# Patient Record
Sex: FEMALE | Race: ASIAN | NOT HISPANIC OR LATINO
[De-identification: names, ages, dates, MRNs, and addresses within clinical notes are randomized per-mention and may not be internally consistent; named-entity substitution may affect disease eponyms.]

---

## 2017-10-12 ENCOUNTER — TRANSCRIPTION ENCOUNTER (OUTPATIENT)
Age: 32
End: 2017-10-12

## 2017-10-16 ENCOUNTER — APPOINTMENT (OUTPATIENT)
Dept: ORTHOPEDIC SURGERY | Facility: CLINIC | Age: 32
End: 2017-10-16

## 2017-11-02 ENCOUNTER — APPOINTMENT (OUTPATIENT)
Dept: ORTHOPEDIC SURGERY | Facility: CLINIC | Age: 32
End: 2017-11-02
Payer: MEDICAID

## 2017-11-02 VITALS
DIASTOLIC BLOOD PRESSURE: 111 MMHG | SYSTOLIC BLOOD PRESSURE: 142 MMHG | HEIGHT: 64 IN | HEART RATE: 92 BPM | BODY MASS INDEX: 27.66 KG/M2 | WEIGHT: 162 LBS

## 2017-11-02 DIAGNOSIS — Z78.9 OTHER SPECIFIED HEALTH STATUS: ICD-10-CM

## 2017-11-02 DIAGNOSIS — Z86.39 PERSONAL HISTORY OF OTHER ENDOCRINE, NUTRITIONAL AND METABOLIC DISEASE: ICD-10-CM

## 2017-11-02 DIAGNOSIS — M54.12 RADICULOPATHY, CERVICAL REGION: ICD-10-CM

## 2017-11-02 PROCEDURE — 99204 OFFICE O/P NEW MOD 45 MIN: CPT

## 2017-11-02 PROCEDURE — 72040 X-RAY EXAM NECK SPINE 2-3 VW: CPT

## 2017-11-02 PROCEDURE — 72100 X-RAY EXAM L-S SPINE 2/3 VWS: CPT

## 2017-11-02 RX ORDER — CYCLOBENZAPRINE HYDROCHLORIDE 5 MG/1
5 TABLET, FILM COATED ORAL
Qty: 30 | Refills: 0 | Status: ACTIVE | COMMUNITY
Start: 2017-09-26

## 2017-11-02 RX ORDER — PREDNISONE 10 MG/1
10 TABLET ORAL
Qty: 42 | Refills: 0 | Status: ACTIVE | COMMUNITY
Start: 2017-09-26

## 2017-11-02 RX ORDER — METFORMIN HYDROCHLORIDE 1000 MG/1
1000 TABLET, COATED ORAL
Refills: 0 | Status: ACTIVE | COMMUNITY

## 2017-11-02 RX ORDER — GABAPENTIN 300 MG/1
300 CAPSULE ORAL
Qty: 90 | Refills: 0 | Status: ACTIVE | COMMUNITY
Start: 2017-09-26

## 2017-11-02 RX ORDER — NAPROXEN 500 MG/1
500 TABLET ORAL
Qty: 60 | Refills: 0 | Status: ACTIVE | COMMUNITY
Start: 2017-09-26

## 2017-11-14 ENCOUNTER — APPOINTMENT (OUTPATIENT)
Dept: MRI IMAGING | Facility: IMAGING CENTER | Age: 32
End: 2017-11-14

## 2018-01-01 ENCOUNTER — OUTPATIENT (OUTPATIENT)
Dept: OUTPATIENT SERVICES | Facility: HOSPITAL | Age: 33
LOS: 1 days | End: 2018-01-01
Payer: MEDICAID

## 2018-01-03 ENCOUNTER — RX RENEWAL (OUTPATIENT)
Age: 33
End: 2018-01-03

## 2018-01-18 ENCOUNTER — EMERGENCY (EMERGENCY)
Facility: HOSPITAL | Age: 33
LOS: 1 days | Discharge: HOME | End: 2018-01-18
Attending: EMERGENCY MEDICINE | Admitting: EMERGENCY MEDICINE
Payer: MEDICAID

## 2018-01-18 VITALS
HEART RATE: 109 BPM | DIASTOLIC BLOOD PRESSURE: 82 MMHG | SYSTOLIC BLOOD PRESSURE: 144 MMHG | TEMPERATURE: 98 F | RESPIRATION RATE: 18 BRPM | OXYGEN SATURATION: 98 %

## 2018-01-18 PROCEDURE — 99284 EMERGENCY DEPT VISIT MOD MDM: CPT | Mod: 25

## 2018-01-18 RX ORDER — ACETAMINOPHEN 500 MG
650 TABLET ORAL ONCE
Qty: 0 | Refills: 0 | Status: COMPLETED | OUTPATIENT
Start: 2018-01-18 | End: 2018-01-19

## 2018-01-18 RX ORDER — LIDOCAINE 4 G/100G
1 CREAM TOPICAL ONCE
Qty: 0 | Refills: 0 | Status: COMPLETED | OUTPATIENT
Start: 2018-01-18 | End: 2018-01-18

## 2018-01-18 RX ORDER — OXYCODONE HYDROCHLORIDE 5 MG/1
5 TABLET ORAL ONCE
Qty: 0 | Refills: 0 | Status: DISCONTINUED | OUTPATIENT
Start: 2018-01-18 | End: 2018-01-18

## 2018-01-18 RX ORDER — KETOROLAC TROMETHAMINE 30 MG/ML
30 SYRINGE (ML) INJECTION ONCE
Qty: 0 | Refills: 0 | Status: DISCONTINUED | OUTPATIENT
Start: 2018-01-18 | End: 2018-01-18
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
## 2018-01-19 ENCOUNTER — INPATIENT (INPATIENT)
Facility: HOSPITAL | Age: 33
LOS: 3 days | Discharge: HOME | End: 2018-01-23
Attending: HOSPITALIST | Admitting: HOSPITALIST
Payer: MEDICAID

## 2018-01-19 VITALS
RESPIRATION RATE: 18 BRPM | DIASTOLIC BLOOD PRESSURE: 66 MMHG | TEMPERATURE: 98 F | HEART RATE: 83 BPM | SYSTOLIC BLOOD PRESSURE: 130 MMHG | OXYGEN SATURATION: 100 %

## 2018-01-19 VITALS
OXYGEN SATURATION: 99 % | SYSTOLIC BLOOD PRESSURE: 131 MMHG | HEART RATE: 89 BPM | DIASTOLIC BLOOD PRESSURE: 72 MMHG | RESPIRATION RATE: 18 BRPM

## 2018-01-19 DIAGNOSIS — E11.9 TYPE 2 DIABETES MELLITUS WITHOUT COMPLICATIONS: ICD-10-CM

## 2018-01-19 DIAGNOSIS — Z29.9 ENCOUNTER FOR PROPHYLACTIC MEASURES, UNSPECIFIED: ICD-10-CM

## 2018-01-19 DIAGNOSIS — R26.2 DIFFICULTY IN WALKING, NOT ELSEWHERE CLASSIFIED: ICD-10-CM

## 2018-01-19 DIAGNOSIS — M54.30 SCIATICA, UNSPECIFIED SIDE: ICD-10-CM

## 2018-01-19 DIAGNOSIS — M54.31 SCIATICA, RIGHT SIDE: ICD-10-CM

## 2018-01-19 LAB
ALBUMIN SERPL ELPH-MCNC: 3.9 G/DL — SIGNIFICANT CHANGE UP (ref 3.3–5)
ALP SERPL-CCNC: 45 U/L — SIGNIFICANT CHANGE UP (ref 40–120)
ALT FLD-CCNC: 20 U/L — SIGNIFICANT CHANGE UP (ref 4–33)
AST SERPL-CCNC: 22 U/L — SIGNIFICANT CHANGE UP (ref 4–32)
BASOPHILS # BLD AUTO: 0.04 K/UL — SIGNIFICANT CHANGE UP (ref 0–0.2)
BASOPHILS NFR BLD AUTO: 0.5 % — SIGNIFICANT CHANGE UP (ref 0–2)
BILIRUB SERPL-MCNC: 0.7 MG/DL — SIGNIFICANT CHANGE UP (ref 0.2–1.2)
BUN SERPL-MCNC: 13 MG/DL — SIGNIFICANT CHANGE UP (ref 7–23)
CALCIUM SERPL-MCNC: 8.6 MG/DL — SIGNIFICANT CHANGE UP (ref 8.4–10.5)
CHLORIDE SERPL-SCNC: 98 MMOL/L — SIGNIFICANT CHANGE UP (ref 98–107)
CO2 SERPL-SCNC: 23 MMOL/L — SIGNIFICANT CHANGE UP (ref 22–31)
CREAT SERPL-MCNC: 0.47 MG/DL — LOW (ref 0.5–1.3)
EOSINOPHIL # BLD AUTO: 0.07 K/UL — SIGNIFICANT CHANGE UP (ref 0–0.5)
EOSINOPHIL NFR BLD AUTO: 0.8 % — SIGNIFICANT CHANGE UP (ref 0–6)
GLUCOSE SERPL-MCNC: 268 MG/DL — HIGH (ref 70–99)
HCT VFR BLD CALC: 43.3 % — SIGNIFICANT CHANGE UP (ref 34.5–45)
HGB BLD-MCNC: 14.8 G/DL — SIGNIFICANT CHANGE UP (ref 11.5–15.5)
IMM GRANULOCYTES # BLD AUTO: 0.02 # — SIGNIFICANT CHANGE UP
IMM GRANULOCYTES NFR BLD AUTO: 0.2 % — SIGNIFICANT CHANGE UP (ref 0–1.5)
LYMPHOCYTES # BLD AUTO: 3.14 K/UL — SIGNIFICANT CHANGE UP (ref 1–3.3)
LYMPHOCYTES # BLD AUTO: 36.1 % — SIGNIFICANT CHANGE UP (ref 13–44)
MCHC RBC-ENTMCNC: 29.7 PG — SIGNIFICANT CHANGE UP (ref 27–34)
MCHC RBC-ENTMCNC: 34.2 % — SIGNIFICANT CHANGE UP (ref 32–36)
MCV RBC AUTO: 86.9 FL — SIGNIFICANT CHANGE UP (ref 80–100)
MONOCYTES # BLD AUTO: 0.67 K/UL — SIGNIFICANT CHANGE UP (ref 0–0.9)
MONOCYTES NFR BLD AUTO: 7.7 % — SIGNIFICANT CHANGE UP (ref 2–14)
NEUTROPHILS # BLD AUTO: 4.77 K/UL — SIGNIFICANT CHANGE UP (ref 1.8–7.4)
NEUTROPHILS NFR BLD AUTO: 54.7 % — SIGNIFICANT CHANGE UP (ref 43–77)
NRBC # FLD: 0 — SIGNIFICANT CHANGE UP
PLATELET # BLD AUTO: 287 K/UL — SIGNIFICANT CHANGE UP (ref 150–400)
PMV BLD: 9.3 FL — SIGNIFICANT CHANGE UP (ref 7–13)
POTASSIUM SERPL-MCNC: 4.3 MMOL/L — SIGNIFICANT CHANGE UP (ref 3.5–5.3)
POTASSIUM SERPL-SCNC: 4.3 MMOL/L — SIGNIFICANT CHANGE UP (ref 3.5–5.3)
PROT SERPL-MCNC: 7.4 G/DL — SIGNIFICANT CHANGE UP (ref 6–8.3)
RBC # BLD: 4.98 M/UL — SIGNIFICANT CHANGE UP (ref 3.8–5.2)
RBC # FLD: 11.4 % — SIGNIFICANT CHANGE UP (ref 10.3–14.5)
SODIUM SERPL-SCNC: 134 MMOL/L — LOW (ref 135–145)
WBC # BLD: 8.71 K/UL — SIGNIFICANT CHANGE UP (ref 3.8–10.5)
WBC # FLD AUTO: 8.71 K/UL — SIGNIFICANT CHANGE UP (ref 3.8–10.5)

## 2018-01-19 PROCEDURE — 96372 THER/PROPH/DIAG INJ SC/IM: CPT

## 2018-01-19 PROCEDURE — 99283 EMERGENCY DEPT VISIT LOW MDM: CPT | Mod: 25

## 2018-01-19 PROCEDURE — 99223 1ST HOSP IP/OBS HIGH 75: CPT

## 2018-01-19 RX ORDER — ONDANSETRON 8 MG/1
4 TABLET, FILM COATED ORAL ONCE
Qty: 0 | Refills: 0 | Status: COMPLETED | OUTPATIENT
Start: 2018-01-19 | End: 2018-01-19

## 2018-01-19 RX ORDER — ACETAMINOPHEN 500 MG
1000 TABLET ORAL ONCE
Qty: 0 | Refills: 0 | Status: COMPLETED | OUTPATIENT
Start: 2018-01-19 | End: 2018-01-19

## 2018-01-19 RX ORDER — DEXTROSE 50 % IN WATER 50 %
1 SYRINGE (ML) INTRAVENOUS ONCE
Qty: 0 | Refills: 0 | Status: DISCONTINUED | OUTPATIENT
Start: 2018-01-19 | End: 2018-01-23

## 2018-01-19 RX ORDER — IBUPROFEN 200 MG
600 TABLET ORAL
Qty: 0 | Refills: 0 | Status: DISCONTINUED | OUTPATIENT
Start: 2018-01-19 | End: 2018-01-20

## 2018-01-19 RX ORDER — DEXTROSE 50 % IN WATER 50 %
25 SYRINGE (ML) INTRAVENOUS ONCE
Qty: 0 | Refills: 0 | Status: DISCONTINUED | OUTPATIENT
Start: 2018-01-19 | End: 2018-01-23

## 2018-01-19 RX ORDER — GLUCAGON INJECTION, SOLUTION 0.5 MG/.1ML
1 INJECTION, SOLUTION SUBCUTANEOUS ONCE
Qty: 0 | Refills: 0 | Status: DISCONTINUED | OUTPATIENT
Start: 2018-01-19 | End: 2018-01-23

## 2018-01-19 RX ORDER — MORPHINE SULFATE 50 MG/1
4 CAPSULE, EXTENDED RELEASE ORAL EVERY 4 HOURS
Qty: 0 | Refills: 0 | Status: DISCONTINUED | OUTPATIENT
Start: 2018-01-19 | End: 2018-01-19

## 2018-01-19 RX ORDER — DIAZEPAM 5 MG
1 TABLET ORAL
Qty: 9 | Refills: 0 | OUTPATIENT
Start: 2018-01-19 | End: 2018-01-21

## 2018-01-19 RX ORDER — MORPHINE SULFATE 50 MG/1
4 CAPSULE, EXTENDED RELEASE ORAL ONCE
Qty: 0 | Refills: 0 | Status: DISCONTINUED | OUTPATIENT
Start: 2018-01-19 | End: 2018-01-19

## 2018-01-19 RX ORDER — CYCLOBENZAPRINE HYDROCHLORIDE 10 MG/1
10 TABLET, FILM COATED ORAL THREE TIMES A DAY
Qty: 0 | Refills: 0 | Status: DISCONTINUED | OUTPATIENT
Start: 2018-01-19 | End: 2018-01-23

## 2018-01-19 RX ORDER — INSULIN LISPRO 100/ML
VIAL (ML) SUBCUTANEOUS AT BEDTIME
Qty: 0 | Refills: 0 | Status: DISCONTINUED | OUTPATIENT
Start: 2018-01-19 | End: 2018-01-22

## 2018-01-19 RX ORDER — ACETAMINOPHEN 500 MG
650 TABLET ORAL ONCE
Qty: 0 | Refills: 0 | Status: COMPLETED | OUTPATIENT
Start: 2018-01-19 | End: 2018-01-19

## 2018-01-19 RX ORDER — MORPHINE SULFATE 50 MG/1
4 CAPSULE, EXTENDED RELEASE ORAL EVERY 6 HOURS
Qty: 0 | Refills: 0 | Status: DISCONTINUED | OUTPATIENT
Start: 2018-01-19 | End: 2018-01-22

## 2018-01-19 RX ORDER — DIAZEPAM 5 MG
5 TABLET ORAL ONCE
Qty: 0 | Refills: 0 | Status: DISCONTINUED | OUTPATIENT
Start: 2018-01-19 | End: 2018-01-19

## 2018-01-19 RX ORDER — SODIUM CHLORIDE 9 MG/ML
1000 INJECTION, SOLUTION INTRAVENOUS
Qty: 0 | Refills: 0 | Status: DISCONTINUED | OUTPATIENT
Start: 2018-01-19 | End: 2018-01-23

## 2018-01-19 RX ORDER — IBUPROFEN 200 MG
600 TABLET ORAL EVERY 6 HOURS
Qty: 0 | Refills: 0 | Status: DISCONTINUED | OUTPATIENT
Start: 2018-01-19 | End: 2018-01-19

## 2018-01-19 RX ORDER — INSULIN LISPRO 100/ML
VIAL (ML) SUBCUTANEOUS
Qty: 0 | Refills: 0 | Status: DISCONTINUED | OUTPATIENT
Start: 2018-01-19 | End: 2018-01-22

## 2018-01-19 RX ORDER — KETOROLAC TROMETHAMINE 30 MG/ML
15 SYRINGE (ML) INJECTION ONCE
Qty: 0 | Refills: 0 | Status: DISCONTINUED | OUTPATIENT
Start: 2018-01-19 | End: 2018-01-19

## 2018-01-19 RX ORDER — DEXTROSE 50 % IN WATER 50 %
12.5 SYRINGE (ML) INTRAVENOUS ONCE
Qty: 0 | Refills: 0 | Status: DISCONTINUED | OUTPATIENT
Start: 2018-01-19 | End: 2018-01-23

## 2018-01-19 RX ADMIN — Medication 650 MILLIGRAM(S): at 01:10

## 2018-01-19 RX ADMIN — ONDANSETRON 4 MILLIGRAM(S): 8 TABLET, FILM COATED ORAL at 00:48

## 2018-01-19 RX ADMIN — Medication 650 MILLIGRAM(S): at 04:34

## 2018-01-19 RX ADMIN — LIDOCAINE 1 PATCH: 4 CREAM TOPICAL at 00:07

## 2018-01-19 RX ADMIN — Medication 2: at 23:37

## 2018-01-19 RX ADMIN — Medication 650 MILLIGRAM(S): at 00:03

## 2018-01-19 RX ADMIN — MORPHINE SULFATE 4 MILLIGRAM(S): 50 CAPSULE, EXTENDED RELEASE ORAL at 16:40

## 2018-01-19 RX ADMIN — Medication 30 MILLIGRAM(S): at 01:10

## 2018-01-19 RX ADMIN — MORPHINE SULFATE 4 MILLIGRAM(S): 50 CAPSULE, EXTENDED RELEASE ORAL at 16:23

## 2018-01-19 RX ADMIN — CYCLOBENZAPRINE HYDROCHLORIDE 10 MILLIGRAM(S): 10 TABLET, FILM COATED ORAL at 20:41

## 2018-01-19 RX ADMIN — Medication 30 MILLIGRAM(S): at 00:03

## 2018-01-19 RX ADMIN — Medication 600 MILLIGRAM(S): at 21:00

## 2018-01-19 RX ADMIN — Medication 15 MILLIGRAM(S): at 15:42

## 2018-01-19 RX ADMIN — Medication 1000 MILLIGRAM(S): at 14:55

## 2018-01-19 RX ADMIN — Medication 5 MILLIGRAM(S): at 01:11

## 2018-01-19 RX ADMIN — OXYCODONE HYDROCHLORIDE 5 MILLIGRAM(S): 5 TABLET ORAL at 00:03

## 2018-01-19 RX ADMIN — Medication 400 MILLIGRAM(S): at 14:41

## 2018-01-19 RX ADMIN — OXYCODONE HYDROCHLORIDE 5 MILLIGRAM(S): 5 TABLET ORAL at 01:10

## 2018-01-19 NOTE — H&P ADULT - NSHPLABSRESULTS_GEN_ALL_CORE
CBC Full  -  ( 19 Jan 2018 17:33 )  WBC Count : 8.71 K/uL  Hemoglobin : 14.8 g/dL  Hematocrit : 43.3 %  Platelet Count - Automated : 287 K/uL  Mean Cell Volume : 86.9 fL  Mean Cell Hemoglobin : 29.7 pg  Mean Cell Hemoglobin Concentration : 34.2 %  Auto Neutrophil # : 4.77 K/uL  Auto Lymphocyte # : 3.14 K/uL  Auto Monocyte # : 0.67 K/uL  Auto Eosinophil # : 0.07 K/uL  Auto Basophil # : 0.04 K/uL  Auto Neutrophil % : 54.7 %  Auto Lymphocyte % : 36.1 %  Auto Monocyte % : 7.7 %  Auto Eosinophil % : 0.8 %  Auto Basophil % : 0.5 %

## 2018-01-19 NOTE — ED ADULT TRIAGE NOTE - CHIEF COMPLAINT QUOTE
Arrives via EMS with c/o left sided back pain radiating down leg with tingling and numbness.  Pt evaluated for same at [facility] under the name of Rosy MR [id number], d/c this morning.  As per note pt has been evaluated by spinal surgeon and pending MRI, currently on PT.

## 2018-01-19 NOTE — ED PROVIDER NOTE - PHYSICAL EXAMINATION
ATTENDING PHYSICAL EXAM DR. MCINTOSH ***GEN - mild distress, writhing pain; A+O x3 ***HEAD - NC/AT ***EYES/NOSE - PERRL, EOMI, mucous membranes moist, no discharge ***THROAT: Oral cavity and pharynx normal. No inflammation, swelling, exudate, or lesions.  ***NECK: Neck supple, non-tender without lymphadenopathy, no masses, no thyromegaly.   ***PULMONARY - CTA b/l, symmetric breath sounds. ***CARDIAC -s1s2, RRR, no M,G,R  ***ABDOMEN - +BS, ND, NT, soft, no guarding, no rebound, no masses   ***BACK - no CVA tenderness, Normal  spine ***EXTREMITIES - symmetric pulses, 2+ dp, capillary refill < 2 seconds, no clubbing, no cyanosis, no edema ***SKIN - no rash or bruising, UE skin lesions scarred ?track marks  ***NEUROLOGIC - alert, CN 2-12 intact, reflexes nl, sensation nl, coordination nl, finger to nose nl, poor effort but motor 5/5 RUE/LUE/RLE/LLE/EHL/Plantar flexion, no pronator drift

## 2018-01-19 NOTE — H&P ADULT - PROBLEM SELECTOR PLAN 2
Hx of DM, will check a1c and provide consistent carbohydrate diet  Will add ISS PRN, check FS qid Acute on Chronic Sciatica refractory to Percocet at home x 1 day.   - Flexaril 10 mg PO TID PRN Muscle Spasm, Ibuprofen 600 mg PO q6h PRN Severe Pain, and Morphine 4 mg IV q6h PRN Breakthrough Pain  - Trend BMP daily  - PT Consult  - Follow up MRI of Lumbar Spine

## 2018-01-19 NOTE — ED PROVIDER NOTE - OBJECTIVE STATEMENT
32 y.o. female hx of DM p/w lower back pain. States that pain started on Sunday, no trauma or injury to the area. Pain was manageable at first but progressed to the point where she is now having pain with ambulation and certain positions. Pain is mostly located in the R buttock area radiating down the entire RLE into the foot with associated numbness and tingling. Denies bowel/bladder incontinence, saddle anesthesia, fevers, chills, dysuria. Took oxycodone for pain today but obtained no relief.

## 2018-01-19 NOTE — H&P ADULT - HISTORY OF PRESENT ILLNESS
32F hx of L4/L5 herniated disc, Diabetes, presents to [facility] ED c/o severe right sided 10/10 RLE pain. Pain starts at her right buttocks and radiates down to her right foot. It is sharp in quality and is 10/10 starting today. The first time she had sciatica pain was October 2017, and since then she has been maintaining herself with PT and PRN Percocet. Today her pain is worse, not alleviated by percocet and nothing provocative in particular. She denies feeling concomitant fevers or chills. She initially went to [facility] ER but was discharged. Because the pain was so severe affecting her walking she came to [facility] ED.    In the ED patient was given 1g IV Tylenol (14:00), Toradol 15 mg IV x 1 (15:00), Valium 2mg IV x 1 (16:00), and Morphine 4 mg IV x 1 (16:00).

## 2018-01-19 NOTE — ED PROVIDER NOTE - PROGRESS NOTE DETAILS
Patient was endorsed to me by Dr. Santiago     at  1    to follow up results of   reassess      and dispo pending these results and re evaluation. Upon my re evaluation of the patient I found that patient resting comfortably - story consistent with sciatica - pt has seen spinal surgeon and is scheduled for MRI and has PT - will d/c with follow up Camron Salomon M.D., Attending Physician

## 2018-01-19 NOTE — H&P ADULT - PROBLEM SELECTOR PLAN 3
Low risk no VTE required  Dispo Home pending ability to ambulate Hx of DM, will check a1c and provide consistent carbohydrate diet  Will add ISS PRN, check FS qid

## 2018-01-19 NOTE — ED PROVIDER NOTE - PLAN OF CARE
1. return for worsening symptoms or anything concerning to you  2. take all home meds as prescribed  3. follow up with your pmd call to make an appointment  4. Take Tylenol 650 mg every 6 hours as needed for pain.  5. Take motrin 600mg PO Q6 hours prn pain

## 2018-01-19 NOTE — H&P ADULT - NSHPPHYSICALEXAM_GEN_ALL_CORE
Vital Signs Last 24 Hrs  T(C): 36.5 (19 Jan 2018 12:00), Max: 36.7 (18 Jan 2018 23:05)  T(F): 97.7 (19 Jan 2018 12:00), Max: 98.1 (19 Jan 2018 01:53)  HR: 83 (19 Jan 2018 12:00) (79 - 109)  BP: 130/66 (19 Jan 2018 12:00) (124/68 - 144/82)  BP(mean): --  RR: 18 (19 Jan 2018 12:00) (16 - 18)  SpO2: 100% (19 Jan 2018 12:00) (98% - 100%)    General: uncomfortable 2/2 pain, speaking in full sentences   HEENT: EOMI, PERRLA, no conjunctival pallor, MMM, no JVD, no thyromegaly, neck supple, trachea midline  CV: S1S2 RRR no MRG  Lungs: CTA BL  Abdomen: soft NTND +BS   Extremities: No CCE +WWP  Skin/MSK: No rashes, preserved ROM on active & passive movement  Neuro: AAOx3, no focal deficits (5/5 strength all extremities), no sensory deficits, (+) Straight leg test on right

## 2018-01-19 NOTE — ED PROVIDER NOTE - MEDICAL DECISION MAKING DETAILS
Pamela: 32 year old female with dm here with right sided buttock pain radiating down rle. + straight leg test, + 5/5 b/l le. took oxycodone for pain with minimal relief. no midline tenderness, no bowel/bladder incontinence. no fevers. + ttp right buttocks.  will give pain control, reassess.

## 2018-01-19 NOTE — ED ADULT NURSE REASSESSMENT NOTE - NS ED NURSE REASSESS COMMENT FT1
Pt asleep in stretcher with eyes closed, arouses easily to voice, pt is AAOx4, NAD, resp nonlabored, resting comfortably in bed. Pt no longer expressive, restless. Pt reports improvement in R buttock pain to 8/10. Pt denies headache, dizziness, chest pain, palpitations, SOB, abd pain, n/v/d, urinary/bowel incontinence/symptoms, fevers, chills, weakness, numbness/tingling at this time. Pt awaiting reassessment and dispo. Comfort measures provided. Safety maintained.

## 2018-01-19 NOTE — ED PROVIDER NOTE - PROGRESS NOTE DETAILS
Patient reports ongoing severe pain and inability to ambulate. Has received tylenol, toradol, morphine. Will need admit for MRI, PT, pain control. Not CDU candidate due to non ambulatory status.   Jonathan Wilcox MD, PGY1

## 2018-01-19 NOTE — H&P ADULT - PROBLEM SELECTOR PLAN 1
Acute on Chronic Sciatica refractory to Percocet at home x 1 day.   - Flexaril 10 mg PO TID PRN Muscle Spasm, Ibuprofen 600 mg PO q6h PRN Severe Pain, and Morphine 4 mg IV q6h PRN Breakthrough Pain  - Trend BMP daily  - PT Consult  - Follow up MRI of Lumbar Spine Being admitted for inabiilty to ambulate 2/2 acute intractable back pain  - Pain control PRN and PT consult

## 2018-01-19 NOTE — ED ADULT NURSE NOTE - CHIEF COMPLAINT QUOTE
Arrives via EMS with c/o left sided back pain radiating down leg with tingling and numbness.  Pt evaluated for same at [facility] under the name of Rosy MR [id number], d/c this morning.  As per note pt has been evaluated by spinal surgeon and pending MRI, currently on PT.

## 2018-01-19 NOTE — H&P ADULT - NSHPREVIEWOFSYSTEMS_GEN_ALL_CORE
REVIEW OF SYSTEMS:    CONSTITUTIONAL: No weakness, fevers or chills  EYES/ENT: No visual changes;  No vertigo or throat pain   NECK: No pain or stiffness  RESPIRATORY: No cough, wheezing, hemoptysis; No shortness of breath  CARDIOVASCULAR: No chest pain or palpitations  GASTROINTESTINAL: No abdominal or epigastric pain. No nausea, vomiting, or hematemesis; No diarrhea or constipation. No melena or hematochezia.  GENITOURINARY: No dysuria, frequency or hematuria  NEUROLOGICAL: No numbness or weakness  MSK: (+) RLE pain   SKIN: No itching, burning, rashes, or lesions   All other review of systems is negative unless indicated above.

## 2018-01-19 NOTE — ED PROVIDER NOTE - CARE PLAN
Principal Discharge DX:	Back pain  Assessment and plan of treatment:	1. return for worsening symptoms or anything concerning to you  2. take all home meds as prescribed  3. follow up with your pmd call to make an appointment  4. Take Tylenol 650 mg every 6 hours as needed for pain.  5. Take motrin 600mg PO Q6 hours prn pain

## 2018-01-19 NOTE — H&P ADULT - PROBLEM SELECTOR PROBLEM 2
Type 2 diabetes mellitus without complication, without long-term current use of insulin Sciatica of right side

## 2018-01-19 NOTE — ED PROVIDER NOTE - MEDICAL DECISION MAKING DETAILS
32F, hx DM, presents with right sided low back pain radiating to right leg, acute on chronic. Seen at [facility] ED this AM and d/c home with dx sciatica. Endorsing ongoing pain, no relief with percocet this AM. Follows with Dr Long. Will administer 1g tylenol, re-assess. Currently stable, Will continue to follow up and re-assess. Case discussed with Attending: Tessa Wilcox MD, PGY1

## 2018-01-19 NOTE — ED ADULT NURSE NOTE - OBJECTIVE STATEMENT
32 F presents to the ED with non traumatic right buttock pain radiating down the leg.  Pt was seen at [facility] earlier this morning and discharged on tylenol and motrin.  Pt denies bowel or urinary incontinence, numbness or tingling to extremities.  + Difficulty walking.  Pt is on physical therapy and follows with Ortho Dr. Long and is awaiting and MRI.
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
## 2018-01-19 NOTE — ED PROVIDER NOTE - OBJECTIVE STATEMENT
32F presents to the ED with lower back pain, chronic  but worse over the past 2 days.  Pt was seen at [facility] earlier this morning and discharged on tylenol and motrin.  No bowel or urinary incontinence.  +paresthesia +anesthesia to RLE.  No weakness to extremities.   Pain radiates to RLE.  No IVDA use, no fever.  Took one percocet PTA at home this morning with no relief.    No recent trauma.  Pt had similar exacerbation in Sept 2017.   Pt is on physical therapy and follows with Ortho Dr. Long and is awaiting and MRI.

## 2018-01-20 LAB
BUN SERPL-MCNC: 15 MG/DL — SIGNIFICANT CHANGE UP (ref 7–23)
CALCIUM SERPL-MCNC: 8.9 MG/DL — SIGNIFICANT CHANGE UP (ref 8.4–10.5)
CHLORIDE SERPL-SCNC: 102 MMOL/L — SIGNIFICANT CHANGE UP (ref 98–107)
CO2 SERPL-SCNC: 23 MMOL/L — SIGNIFICANT CHANGE UP (ref 22–31)
CREAT SERPL-MCNC: 0.44 MG/DL — LOW (ref 0.5–1.3)
GLUCOSE SERPL-MCNC: 260 MG/DL — HIGH (ref 70–99)
HBA1C BLD-MCNC: 11.7 % — HIGH (ref 4–5.6)
MAGNESIUM SERPL-MCNC: 2.1 MG/DL — SIGNIFICANT CHANGE UP (ref 1.6–2.6)
POTASSIUM SERPL-MCNC: 4.6 MMOL/L — SIGNIFICANT CHANGE UP (ref 3.5–5.3)
POTASSIUM SERPL-SCNC: 4.6 MMOL/L — SIGNIFICANT CHANGE UP (ref 3.5–5.3)
SODIUM SERPL-SCNC: 139 MMOL/L — SIGNIFICANT CHANGE UP (ref 135–145)

## 2018-01-20 PROCEDURE — 99233 SBSQ HOSP IP/OBS HIGH 50: CPT

## 2018-01-20 PROCEDURE — 72148 MRI LUMBAR SPINE W/O DYE: CPT | Mod: 26

## 2018-01-20 RX ORDER — GABAPENTIN 400 MG/1
100 CAPSULE ORAL THREE TIMES A DAY
Qty: 0 | Refills: 0 | Status: DISCONTINUED | OUTPATIENT
Start: 2018-01-20 | End: 2018-01-22

## 2018-01-20 RX ORDER — OXYCODONE AND ACETAMINOPHEN 5; 325 MG/1; MG/1
1 TABLET ORAL ONCE
Qty: 0 | Refills: 0 | Status: DISCONTINUED | OUTPATIENT
Start: 2018-01-20 | End: 2018-01-20

## 2018-01-20 RX ORDER — INSULIN GLARGINE 100 [IU]/ML
15 INJECTION, SOLUTION SUBCUTANEOUS AT BEDTIME
Qty: 0 | Refills: 0 | Status: DISCONTINUED | OUTPATIENT
Start: 2018-01-20 | End: 2018-01-21

## 2018-01-20 RX ORDER — KETOROLAC TROMETHAMINE 30 MG/ML
30 SYRINGE (ML) INJECTION EVERY 8 HOURS
Qty: 0 | Refills: 0 | Status: DISCONTINUED | OUTPATIENT
Start: 2018-01-20 | End: 2018-01-22

## 2018-01-20 RX ORDER — HUMAN INSULIN 100 [IU]/ML
6 INJECTION, SUSPENSION SUBCUTANEOUS ONCE
Qty: 0 | Refills: 0 | Status: COMPLETED | OUTPATIENT
Start: 2018-01-20 | End: 2018-01-20

## 2018-01-20 RX ORDER — INSULIN LISPRO 100/ML
3 VIAL (ML) SUBCUTANEOUS
Qty: 0 | Refills: 0 | Status: DISCONTINUED | OUTPATIENT
Start: 2018-01-20 | End: 2018-01-21

## 2018-01-20 RX ORDER — INSULIN GLARGINE 100 [IU]/ML
10 INJECTION, SOLUTION SUBCUTANEOUS AT BEDTIME
Qty: 0 | Refills: 0 | Status: DISCONTINUED | OUTPATIENT
Start: 2018-01-20 | End: 2018-01-20

## 2018-01-20 RX ORDER — INFLUENZA VIRUS VACCINE 15; 15; 15; 15 UG/.5ML; UG/.5ML; UG/.5ML; UG/.5ML
0.5 SUSPENSION INTRAMUSCULAR ONCE
Qty: 0 | Refills: 0 | Status: DISCONTINUED | OUTPATIENT
Start: 2018-01-20 | End: 2018-01-23

## 2018-01-20 RX ADMIN — Medication 3: at 13:14

## 2018-01-20 RX ADMIN — OXYCODONE AND ACETAMINOPHEN 1 TABLET(S): 5; 325 TABLET ORAL at 13:45

## 2018-01-20 RX ADMIN — Medication 30 MILLIGRAM(S): at 20:40

## 2018-01-20 RX ADMIN — Medication 2: at 18:37

## 2018-01-20 RX ADMIN — OXYCODONE AND ACETAMINOPHEN 1 TABLET(S): 5; 325 TABLET ORAL at 13:13

## 2018-01-20 RX ADMIN — CYCLOBENZAPRINE HYDROCHLORIDE 10 MILLIGRAM(S): 10 TABLET, FILM COATED ORAL at 21:30

## 2018-01-20 RX ADMIN — CYCLOBENZAPRINE HYDROCHLORIDE 10 MILLIGRAM(S): 10 TABLET, FILM COATED ORAL at 05:46

## 2018-01-20 RX ADMIN — HUMAN INSULIN 6 UNIT(S): 100 INJECTION, SUSPENSION SUBCUTANEOUS at 16:13

## 2018-01-20 RX ADMIN — MORPHINE SULFATE 4 MILLIGRAM(S): 50 CAPSULE, EXTENDED RELEASE ORAL at 10:00

## 2018-01-20 RX ADMIN — Medication 2: at 10:37

## 2018-01-20 RX ADMIN — Medication 1: at 21:30

## 2018-01-20 RX ADMIN — Medication 600 MILLIGRAM(S): at 05:46

## 2018-01-20 RX ADMIN — Medication 3 UNIT(S): at 18:36

## 2018-01-20 RX ADMIN — MORPHINE SULFATE 4 MILLIGRAM(S): 50 CAPSULE, EXTENDED RELEASE ORAL at 09:45

## 2018-01-20 RX ADMIN — Medication 3 UNIT(S): at 13:14

## 2018-01-20 RX ADMIN — MORPHINE SULFATE 4 MILLIGRAM(S): 50 CAPSULE, EXTENDED RELEASE ORAL at 01:00

## 2018-01-20 RX ADMIN — MORPHINE SULFATE 4 MILLIGRAM(S): 50 CAPSULE, EXTENDED RELEASE ORAL at 01:07

## 2018-01-20 RX ADMIN — GABAPENTIN 100 MILLIGRAM(S): 400 CAPSULE ORAL at 21:30

## 2018-01-20 RX ADMIN — MORPHINE SULFATE 4 MILLIGRAM(S): 50 CAPSULE, EXTENDED RELEASE ORAL at 16:46

## 2018-01-20 RX ADMIN — OXYCODONE AND ACETAMINOPHEN 1 TABLET(S): 5; 325 TABLET ORAL at 06:59

## 2018-01-20 RX ADMIN — INSULIN GLARGINE 15 UNIT(S): 100 INJECTION, SOLUTION SUBCUTANEOUS at 21:32

## 2018-01-20 RX ADMIN — Medication 30 MILLIGRAM(S): at 20:18

## 2018-01-20 RX ADMIN — OXYCODONE AND ACETAMINOPHEN 1 TABLET(S): 5; 325 TABLET ORAL at 07:30

## 2018-01-20 RX ADMIN — MORPHINE SULFATE 4 MILLIGRAM(S): 50 CAPSULE, EXTENDED RELEASE ORAL at 01:20

## 2018-01-20 NOTE — PHYSICAL THERAPY INITIAL EVALUATION ADULT - PATIENT PROFILE REVIEW, REHAB EVAL
Pt. profile reviewed, consulted with SANYA GALLARDO prior to initial PT evaluation and tx, as per RN, Pt. is OK to participate in skilled therapy session./yes

## 2018-01-20 NOTE — PHYSICAL THERAPY INITIAL EVALUATION ADULT - IMPAIRED TRANSFERS: SIT/STAND, REHAB EVAL
narrow base of support/pain/impaired postural control/decreased strength/Pt. in agreement to participate in skilled therapy session despite complaints of pain./impaired balance

## 2018-01-20 NOTE — PHYSICAL THERAPY INITIAL EVALUATION ADULT - ASR EQUIP NEEDS DISCH PT EVAL
rolling walker (5 inch wheels)/pt. would benefit from use of Rolling Walker upon D/C to optimize safety within the home and decrease fall risk.

## 2018-01-20 NOTE — PHYSICAL THERAPY INITIAL EVALUATION ADULT - IMPAIRMENTS CONTRIBUTING IMPAIRED BED MOBILITY, REHAB EVAL
pain/impaired balance/decreased strength/Pt. in agreement to participate in skilled therapy session despite complaints of pain.

## 2018-01-20 NOTE — PROGRESS NOTE ADULT - SUBJECTIVE AND OBJECTIVE BOX
Patient is a 32y old  Female who presents with a chief complaint of Sciatica flare (19 Jan 2018 18:35)      SUBJECTIVE / OVERNIGHT EVENTS: Pt was seen and examined at 1245pm, no overnight events, pt reports rt hip and leg pain not better with current meds, no weakness, pt reports that she is not on any meds for dm, was using insulin in the past but has not been taking any meds since 2015, no other complaints.    MEDICATIONS  (STANDING):  dextrose 5%. 1000 milliLiter(s) (50 mL/Hr) IV Continuous <Continuous>  dextrose 50% Injectable 12.5 Gram(s) IV Push once  dextrose 50% Injectable 25 Gram(s) IV Push once  dextrose 50% Injectable 25 Gram(s) IV Push once  gabapentin 100 milliGRAM(s) Oral three times a day  influenza   Vaccine 0.5 milliLiter(s) IntraMuscular once  insulin glargine Injectable (LANTUS) 15 Unit(s) SubCutaneous at bedtime  insulin lispro (HumaLOG) corrective regimen sliding scale   SubCutaneous three times a day before meals  insulin lispro (HumaLOG) corrective regimen sliding scale   SubCutaneous at bedtime  insulin lispro Injectable (HumaLOG) 3 Unit(s) SubCutaneous three times a day before meals  ketorolac   Injectable 30 milliGRAM(s) IV Push every 8 hours    MEDICATIONS  (PRN):  cyclobenzaprine 10 milliGRAM(s) Oral three times a day PRN Muscle Spasm  dextrose Gel 1 Dose(s) Oral once PRN Blood Glucose LESS THAN 70 milliGRAM(s)/deciliter  glucagon  Injectable 1 milliGRAM(s) IntraMuscular once PRN Glucose LESS THAN 70 milligrams/deciliter  morphine  - Injectable 4 milliGRAM(s) IV Push every 6 hours PRN Severe Pain (7 - 10)      Vital Signs Last 24 Hrs  T(C): 36.6 (20 Jan 2018 16:48), Max: 36.9 (20 Jan 2018 05:44)  T(F): 97.9 (20 Jan 2018 16:48), Max: 98.4 (20 Jan 2018 05:44)  HR: 68 (20 Jan 2018 16:48) (68 - 98)  BP: 107/58 (20 Jan 2018 16:48) (95/54 - 129/72)  BP(mean): --  RR: 19 (20 Jan 2018 16:48) (16 - 19)  SpO2: 98% (20 Jan 2018 16:48) (96% - 100%)  CAPILLARY BLOOD GLUCOSE      POCT Blood Glucose.: 300 mg/dL (20 Jan 2018 12:34)  POCT Blood Glucose.: 211 mg/dL (20 Jan 2018 10:35)  POCT Blood Glucose.: 307 mg/dL (19 Jan 2018 23:03)  POCT Blood Glucose.: 260 mg/dL (19 Jan 2018 19:52)    I&O's Summary      PHYSICAL EXAM:  GENERAL: NAD, well-developed  CHEST/LUNG: Clear to auscultation bilaterally; No wheeze  HEART: Regular rate and rhythm  ABDOMEN: Soft, Nontender, Nondistended  EXTREMITIES:  no LE edema  PSYCH: calm  NEUROLOGY: AAOx3, rt hip ttp, motor LE 5/5  SKIN: No rashes or lesions    LABS:                        14.8   8.71  )-----------( 287      ( 19 Jan 2018 17:33 )             43.3     01-20    139  |  102  |  15  ----------------------------<  260<H>  4.6   |  23  |  0.44<L>    Ca    8.9      20 Jan 2018 06:21  Mg     2.1     01-20    TPro  7.4  /  Alb  3.9  /  TBili  0.7  /  DBili  x   /  AST  22  /  ALT  20  /  AlkPhos  45  01-19              RADIOLOGY & ADDITIONAL TESTS:    Imaging Personally Reviewed:    Consultant(s) Notes Reviewed:      Care Discussed with Consultants/Other Providers:

## 2018-01-20 NOTE — PHYSICAL THERAPY INITIAL EVALUATION ADULT - BALANCE DISTURBANCE, IDENTIFIED IMPAIRMENT CONTRIBUTE, REHAB EVAL
decreased strength/Pt. in agreement to participate in skilled therapy session despite complaints of pain./pain/impaired postural control

## 2018-01-20 NOTE — PHYSICAL THERAPY INITIAL EVALUATION ADULT - PERTINENT HX OF CURRENT PROBLEM, REHAB EVAL
Pt. is a 32 year old female admitted to [facility] secondary to sciatica. PMH: L4-L5 herniated disc, DM

## 2018-01-20 NOTE — PROGRESS NOTE ADULT - PROBLEM SELECTOR PLAN 2
Acute on Chronic Sciatica refractory to Percocet at home x 1 day.   - Flexaril 10 mg PO TID PRN Muscle Spasm, d/c ibuprofen, added toradol and neurontin, cont Morphine 4 mg IV q6h PRN Breakthrough Pain  Ortho consulted, will not start on steroids as pt with uncontrollled dm  - PT Consult  - s/p MRI of Lumbar Spine results reviewed, appreciate ortho consult Acute on Chronic Sciatica refractory to Percocet at home x 1 day.   - Flexaril 10 mg PO TID PRN Muscle Spasm, d/c ibuprofen, added toradol and neurontin, cont Morphine 4 mg IV q6h PRN Breakthrough Pain  Ortho consulted, will not start on steroids as pt with uncontrollled dm  - PT Consult  - s/p MRI of Lumbar Spine results reviewed, Disc herniation at the L4-L5 level with displacement and impingement of the descending right L5 nerve roots.  appreciate ortho consult

## 2018-01-20 NOTE — PROGRESS NOTE ADULT - PROBLEM SELECTOR PLAN 1
Being admitted for inabiilty to ambulate 2/2 acute intractable back pain  - Pain control PRN and PT consult

## 2018-01-20 NOTE — PHYSICAL THERAPY INITIAL EVALUATION ADULT - ADDITIONAL COMMENTS
Pt. lives in a pvt apartment with their spouse and children. Pt. has 6 steps with HR x 1 to enter their building and has (+) elevator access to reach apartment. Pt. was previously ambulating household and community distances without the use of an AD independently. Pt. was previously independent with ADLs however, they would benefit from occasional assistance. Pt. returned to bed at end of therapy session with all lines and tubes intact, call bell in reach and in NAD.

## 2018-01-20 NOTE — PHYSICAL THERAPY INITIAL EVALUATION ADULT - CRITERIA FOR SKILLED THERAPEUTIC INTERVENTIONS
impairments found/functional limitations in following categories/anticipated D/C to rehab facility to address current functional limitation to optimize safety to allow pt. to return to their prior level of function. please follow PT notes for further functional mobility assessment/rehab potential/anticipated discharge recommendation

## 2018-01-20 NOTE — PHYSICAL THERAPY INITIAL EVALUATION ADULT - TRANSFER SAFETY CONCERNS NOTED: SIT/STAND, REHAB EVAL
In stance, pt. presented with Right LE buckling. Pt. returned to seated position at edge of bed. Pt. declined further assessment of functional mobility secondary to reports of pain. Pt. returned to supine position and in NAD. SANYA GALLARDO made aware and acknowledged./losing balance/decreased weight-shifting ability

## 2018-01-20 NOTE — CONSULT NOTE ADULT - SUBJECTIVE AND OBJECTIVE BOX
MRI LOrthopedic Spine Consult  S: 32 year old female with history of diabetes and chronic low back and neck pain admitted for lower back pain and right lower extremity radicular pain.  Patient was evaluated by Dr. Long in november of 2017 and ordered an MRI C/L spine that was never performed.  Presented to ER on 1/19 with 3 day history of severe low back pain and right lower extremity pain down to toes.  No trauma, fevers, chills.  No weakness, numbness, tingling.        PAST MEDICAL & SURGICAL HISTORY:  Diabetes  No significant past surgical history      O:  T(C): 36.9 (01-20-18 @ 05:44), Max: 36.9 (01-20-18 @ 05:44)  HR: 68 (01-20-18 @ 06:49) (68 - 98)  BP: 101/53 (01-20-18 @ 06:49) (101/53 - 129/72)  RR: 18 (01-20-18 @ 06:49) (16 - 18)  SpO2: 96% (01-20-18 @ 06:49) (96% - 100%)  Wt(kg): --    Gen:  Neuro                 Delt    Bi        Tri        Wrist Flex    Wrist Ext         Right      5/5    5/5     5/5             5/5                5/5           5/5                  SILT C5-T1  Left         5/5   5/5     5/5              5/5               5/5            5/5                   SILT C5-T1                   IP      HS     Quad      TA    GS    EHL    FHL        Right    5/5    5/5       5/5      5/5   5/5    5/5   5/5           No Clonus, SILT L5-S1  Left      5/5    5/5       5/5      5/5   5/5    5/5   5/5            No Clonus,  SILT L5-S1    Imaging:  MRI LSpine: L4/5 HNP with impingement of descending L5 nerve root.    A/P 32 year old female with L4/5 HNP causing RLE radiular pain  Pain Control  WBAT  Steroids if approved by medical team  NSAIDS if no steroids possible  PT  No acute surgical intervention  Discussed with Dr. Long

## 2018-01-21 PROCEDURE — 99233 SBSQ HOSP IP/OBS HIGH 50: CPT

## 2018-01-21 RX ORDER — PANTOPRAZOLE SODIUM 20 MG/1
40 TABLET, DELAYED RELEASE ORAL
Qty: 0 | Refills: 0 | Status: DISCONTINUED | OUTPATIENT
Start: 2018-01-21 | End: 2018-01-23

## 2018-01-21 RX ORDER — INSULIN GLARGINE 100 [IU]/ML
20 INJECTION, SOLUTION SUBCUTANEOUS AT BEDTIME
Qty: 0 | Refills: 0 | Status: DISCONTINUED | OUTPATIENT
Start: 2018-01-21 | End: 2018-01-22

## 2018-01-21 RX ORDER — DOCUSATE SODIUM 100 MG
100 CAPSULE ORAL
Qty: 0 | Refills: 0 | Status: DISCONTINUED | OUTPATIENT
Start: 2018-01-21 | End: 2018-01-23

## 2018-01-21 RX ORDER — INSULIN LISPRO 100/ML
5 VIAL (ML) SUBCUTANEOUS
Qty: 0 | Refills: 0 | Status: DISCONTINUED | OUTPATIENT
Start: 2018-01-21 | End: 2018-01-22

## 2018-01-21 RX ADMIN — MORPHINE SULFATE 4 MILLIGRAM(S): 50 CAPSULE, EXTENDED RELEASE ORAL at 23:38

## 2018-01-21 RX ADMIN — MORPHINE SULFATE 4 MILLIGRAM(S): 50 CAPSULE, EXTENDED RELEASE ORAL at 17:21

## 2018-01-21 RX ADMIN — MORPHINE SULFATE 4 MILLIGRAM(S): 50 CAPSULE, EXTENDED RELEASE ORAL at 11:15

## 2018-01-21 RX ADMIN — MORPHINE SULFATE 4 MILLIGRAM(S): 50 CAPSULE, EXTENDED RELEASE ORAL at 06:53

## 2018-01-21 RX ADMIN — INSULIN GLARGINE 20 UNIT(S): 100 INJECTION, SOLUTION SUBCUTANEOUS at 21:30

## 2018-01-21 RX ADMIN — MORPHINE SULFATE 4 MILLIGRAM(S): 50 CAPSULE, EXTENDED RELEASE ORAL at 23:53

## 2018-01-21 RX ADMIN — Medication 30 MILLIGRAM(S): at 21:31

## 2018-01-21 RX ADMIN — Medication 40 MILLIGRAM(S): at 17:06

## 2018-01-21 RX ADMIN — Medication 5 UNIT(S): at 18:33

## 2018-01-21 RX ADMIN — Medication 3: at 18:33

## 2018-01-21 RX ADMIN — Medication 3 UNIT(S): at 13:08

## 2018-01-21 RX ADMIN — Medication 30 MILLIGRAM(S): at 05:13

## 2018-01-21 RX ADMIN — CYCLOBENZAPRINE HYDROCHLORIDE 10 MILLIGRAM(S): 10 TABLET, FILM COATED ORAL at 15:38

## 2018-01-21 RX ADMIN — GABAPENTIN 100 MILLIGRAM(S): 400 CAPSULE ORAL at 13:08

## 2018-01-21 RX ADMIN — Medication 3 UNIT(S): at 09:11

## 2018-01-21 RX ADMIN — Medication 30 MILLIGRAM(S): at 13:23

## 2018-01-21 RX ADMIN — Medication 1: at 13:08

## 2018-01-21 RX ADMIN — MORPHINE SULFATE 4 MILLIGRAM(S): 50 CAPSULE, EXTENDED RELEASE ORAL at 11:01

## 2018-01-21 RX ADMIN — CYCLOBENZAPRINE HYDROCHLORIDE 10 MILLIGRAM(S): 10 TABLET, FILM COATED ORAL at 21:31

## 2018-01-21 RX ADMIN — GABAPENTIN 100 MILLIGRAM(S): 400 CAPSULE ORAL at 05:13

## 2018-01-21 RX ADMIN — GABAPENTIN 100 MILLIGRAM(S): 400 CAPSULE ORAL at 21:31

## 2018-01-21 RX ADMIN — Medication 100 MILLIGRAM(S): at 17:06

## 2018-01-21 RX ADMIN — Medication 30 MILLIGRAM(S): at 13:08

## 2018-01-21 RX ADMIN — MORPHINE SULFATE 4 MILLIGRAM(S): 50 CAPSULE, EXTENDED RELEASE ORAL at 07:08

## 2018-01-21 RX ADMIN — MORPHINE SULFATE 4 MILLIGRAM(S): 50 CAPSULE, EXTENDED RELEASE ORAL at 00:51

## 2018-01-21 RX ADMIN — Medication 4: at 21:31

## 2018-01-21 RX ADMIN — MORPHINE SULFATE 4 MILLIGRAM(S): 50 CAPSULE, EXTENDED RELEASE ORAL at 01:05

## 2018-01-21 RX ADMIN — Medication 30 MILLIGRAM(S): at 05:28

## 2018-01-21 RX ADMIN — MORPHINE SULFATE 4 MILLIGRAM(S): 50 CAPSULE, EXTENDED RELEASE ORAL at 17:06

## 2018-01-21 RX ADMIN — CYCLOBENZAPRINE HYDROCHLORIDE 10 MILLIGRAM(S): 10 TABLET, FILM COATED ORAL at 05:13

## 2018-01-21 RX ADMIN — Medication 1: at 09:11

## 2018-01-21 NOTE — CONSULT NOTE ADULT - PROBLEM SELECTOR RECOMMENDATION 9
- Recommend Lantus 20 units QHS  - Recommend Humalog 5 units TID AC and low SSI AC and HS  - Page endocrinology if FS>300 with prednisone and then we will adjust premeal Humalog based on FS trend and Insulin requirements  - Counselled on healthy diet as family has brought food from outside the hospital  - Recommend RD consultation    Outpatient Plan  - Basal/bolus insulin with pens.   - Patient knows administration as she has done it in the past  - Recommend f/u [facility], call [phone] for appointment - Recommend Lantus 20 units QHS  - Recommend Humalog 5 units TID AC and low SSI AC and HS  - Page endocrinology if FS>300 with prednisone and then we will adjust premeal Humalog based on FS trend and Insulin requirements  - Counselled on healthy diet as family has brought food from outside the hospital  - Recommend RD consultation    Outpatient Plan  - Basal/bolus insulin with pens.   - Patient knows administration as she has done it in the past  - Recommend f/u [facility], call [phone] for appointment    Flor Arriaza DO  Endocrine Fellow   Pager: [phone] (9am-5pm on Weekdays)  Pager: [phone] (5pm-9am on Weekdays and Weekends)

## 2018-01-21 NOTE — CONSULT NOTE ADULT - ASSESSMENT
31 y/o female with uncontrolled T2DM with neuropathy here with severe radiating low back pain and L4/L5 herniated disc

## 2018-01-21 NOTE — CONSULT NOTE ADULT - SUBJECTIVE AND OBJECTIVE BOX
Reason For Consult: Uncontrolled T2DM    HPI: 32F hx of L4/L5 herniated disc, Diabetes, presents to [facility] ED c/o severe right sided 10/10 RLE pain. Pain starts at her right buttocks and radiates down to her right foot. It is sharp in quality and is 10/10 starting today. The first time she had sciatica pain was October 2017, and since then she has been maintaining herself with PT and PRN Percocet. Today her pain is worse, not alleviated by percocet and nothing provocative in particular. She denies feeling concomitant fevers or chills. She initially went to [facility] ER but was discharged. Because the pain was so severe affecting her walking she came to [facility] ED.    In the ED patient was given 1g IV Tylenol (14:00), Toradol 15 mg IV x 1 (15:00), Valium 2mg IV x 1 (16:00), and Morphine 4 mg IV x 1 (16:00). (19 Jan 2018 18:35)    Endocrine History:      PAST MEDICAL & SURGICAL HISTORY:  Diabetes  No significant past surgical history      FAMILY HISTORY:  No pertinent family history in first degree relatives      Social History:    Outpatient Medications:  Home Medications:   * Patient Currently Takes Medications as of 19-Jan-2018 04:01 documented in Structured Notes  · 	Valium 5 mg oral tablet: 1 tab(s) orally 3 times a day  as needed for spasms MDD:3      MEDICATIONS  (STANDING):  dextrose 5%. 1000 milliLiter(s) (50 mL/Hr) IV Continuous <Continuous>  dextrose 50% Injectable 12.5 Gram(s) IV Push once  dextrose 50% Injectable 25 Gram(s) IV Push once  dextrose 50% Injectable 25 Gram(s) IV Push once  docusate sodium 100 milliGRAM(s) Oral two times a day  gabapentin 100 milliGRAM(s) Oral three times a day  influenza   Vaccine 0.5 milliLiter(s) IntraMuscular once  insulin glargine Injectable (LANTUS) 15 Unit(s) SubCutaneous at bedtime  insulin lispro (HumaLOG) corrective regimen sliding scale   SubCutaneous three times a day before meals  insulin lispro (HumaLOG) corrective regimen sliding scale   SubCutaneous at bedtime  insulin lispro Injectable (HumaLOG) 3 Unit(s) SubCutaneous three times a day before meals  ketorolac   Injectable 30 milliGRAM(s) IV Push every 8 hours    MEDICATIONS  (PRN):  cyclobenzaprine 10 milliGRAM(s) Oral three times a day PRN Muscle Spasm  dextrose Gel 1 Dose(s) Oral once PRN Blood Glucose LESS THAN 70 milliGRAM(s)/deciliter  glucagon  Injectable 1 milliGRAM(s) IntraMuscular once PRN Glucose LESS THAN 70 milligrams/deciliter  morphine  - Injectable 4 milliGRAM(s) IV Push every 6 hours PRN Severe Pain (7 - 10)      Allergies  No Known Allergies    Review of Systems:  Constitutional: No fever  Eyes: No blurry vision  Neuro: No tremors  HEENT: No pain  Cardiovascular: No chest pain, palpitations  Respiratory: No SOB, no cough  GI: No nausea, vomiting, abdominal pain  : No dysuria  Skin: no rash  Psych: no depression  Endocrine: no polyuria, polydipsia  Hem/lymph: no swelling  Osteoporosis: no fractures    ALL OTHER SYSTEMS REVIEWED AND NEGATIVE    UNABLE TO OBTAIN    PHYSICAL EXAM:  VITALS: T(C): 36.8 (01-21-18 @ 05:09)  T(F): 98.3 (01-21-18 @ 05:09), Max: 99.1 (01-20-18 @ 20:42)  HR: 70 (01-21-18 @ 06:52) (68 - 98)  BP: 111/67 (01-21-18 @ 06:52) (102/58 - 111/67)  RR:  (17 - 19)  SpO2:  (97% - 99%)  Wt(kg): 74.8 kg    GENERAL: NAD, well-groomed, well-developed  EYES: No proptosis, no lid lag, anicteric  HEENT:  Atraumatic, Normocephalic, moist mucous membranes  THYROID: Normal size, no palpable nodules  RESPIRATORY: Clear to auscultation bilaterally; No rales, rhonchi, wheezing, or rubs  CARDIOVASCULAR: Regular rate and rhythm; No murmurs; no peripheral edema  GI: Soft, nontender, non distended, normal bowel sounds  SKIN: Dry, intact, No rashes or lesions  MUSCULOSKELETAL: Full range of motion, normal strength  NEURO: sensation intact, extraocular movements intact, no tremor, normal reflexes  PSYCH: Alert and oriented x 3, normal affect, normal mood  CUSHING'S SIGNS: no striae    POCT Blood Glucose.: 175 mg/dL (01-21-18 @ 12:27)  POCT Blood Glucose.: 195 mg/dL (01-21-18 @ 09:01)    POCT Blood Glucose.: 300 mg/dL (01-20-18 @ 21:20) Lantus 15  POCT Blood Glucose.: 243 mg/dL (01-20-18 @ 17:50)  POCT Blood Glucose.: 300 mg/dL (01-20-18 @ 12:34)  POCT Blood Glucose.: 211 mg/dL (01-20-18 @ 10:35)  POCT Blood Glucose.: 307 mg/dL (01-19-18 @ 23:03)  POCT Blood Glucose.: 260 mg/dL (01-19-18 @ 19:52)                            14.8   8.71  )-----------( 287      ( 19 Jan 2018 17:33 )             43.3       01-20    139  |  102  |  15  ----------------------------<  260<H>  4.6   |  23  |  0.44<L>    EGFR if : 157  EGFR if non : 135    Ca    8.9      01-20  Mg     2.1     01-20    TPro  7.4  /  Alb  3.9  /  TBili  0.7  /  DBili  x   /  AST  22  /  ALT  20  /  AlkPhos  45  01-19    Hemoglobin A1C, Whole Blood: 11.7: (01.20.18 @ 06:21) Reason For Consult: Uncontrolled T2DM    HPI: 32F hx of L4/L5 herniated disc, Diabetes, presents to [facility] ED c/o severe right sided 10/10 RLE pain. Pain starts at her right buttocks and radiates down to her right foot. It is sharp in quality and is 10/10 starting today. The first time she had sciatica pain was October 2017, and since then she has been maintaining herself with PT and PRN Percocet. Today her pain is worse, not alleviated by percocet and nothing provocative in particular. She denies feeling concomitant fevers or chills. She initially went to [facility] ER but was discharged. Because the pain was so severe affecting her walking she came to [facility] ED.    In the ED patient was given 1g IV Tylenol (14:00), Toradol 15 mg IV x 1 (15:00), Valium 2mg IV x 1 (16:00), and Morphine 4 mg IV x 1 (16:00). (19 Jan 2018 18:35)    Endocrine History:      PAST MEDICAL & SURGICAL HISTORY:  Diabetes  No significant past surgical history      FAMILY HISTORY:  No pertinent family history in first degree relatives      Social History:    Outpatient Medications:  Home Medications:   * Patient Currently Takes Medications as of 19-Jan-2018 04:01 documented in Structured Notes  · 	Valium 5 mg oral tablet: 1 tab(s) orally 3 times a day  as needed for spasms MDD:3      MEDICATIONS  (STANDING):  dextrose 5%. 1000 milliLiter(s) (50 mL/Hr) IV Continuous <Continuous>  dextrose 50% Injectable 12.5 Gram(s) IV Push once  dextrose 50% Injectable 25 Gram(s) IV Push once  dextrose 50% Injectable 25 Gram(s) IV Push once  docusate sodium 100 milliGRAM(s) Oral two times a day  gabapentin 100 milliGRAM(s) Oral three times a day  influenza   Vaccine 0.5 milliLiter(s) IntraMuscular once  insulin glargine Injectable (LANTUS) 15 Unit(s) SubCutaneous at bedtime  insulin lispro (HumaLOG) corrective regimen sliding scale   SubCutaneous three times a day before meals  insulin lispro (HumaLOG) corrective regimen sliding scale   SubCutaneous at bedtime  insulin lispro Injectable (HumaLOG) 3 Unit(s) SubCutaneous three times a day before meals  ketorolac   Injectable 30 milliGRAM(s) IV Push every 8 hours    MEDICATIONS  (PRN):  cyclobenzaprine 10 milliGRAM(s) Oral three times a day PRN Muscle Spasm  dextrose Gel 1 Dose(s) Oral once PRN Blood Glucose LESS THAN 70 milliGRAM(s)/deciliter  glucagon  Injectable 1 milliGRAM(s) IntraMuscular once PRN Glucose LESS THAN 70 milligrams/deciliter  morphine  - Injectable 4 milliGRAM(s) IV Push every 6 hours PRN Severe Pain (7 - 10)      Allergies  No Known Allergies    Review of Systems:  Constitutional: No fever  Eyes: No blurry vision  Neuro: No tremors  HEENT: No pain  Cardiovascular: No chest pain, palpitations  Respiratory: No SOB, no cough  GI: No nausea, vomiting, abdominal pain  : No dysuria  Skin: no rash  Psych: no depression  Endocrine: no polyuria, polydipsia  Hem/lymph: no swelling  Osteoporosis: no fractures    ALL OTHER SYSTEMS REVIEWED AND NEGATIVE    UNABLE TO OBTAIN    PHYSICAL EXAM:  VITALS: T(C): 36.8 (01-21-18 @ 05:09)  T(F): 98.3 (01-21-18 @ 05:09), Max: 99.1 (01-20-18 @ 20:42)  HR: 70 (01-21-18 @ 06:52) (68 - 98)  BP: 111/67 (01-21-18 @ 06:52) (102/58 - 111/67)  RR:  (17 - 19)  SpO2:  (97% - 99%)  Wt(kg): 74.8 kg    GENERAL: NAD, well-groomed, well-developed  EYES: No proptosis, no lid lag, anicteric  HEENT:  Atraumatic, Normocephalic, moist mucous membranes  THYROID: Normal size, no palpable nodules  RESPIRATORY: Clear to auscultation bilaterally; No rales, rhonchi, wheezing, or rubs  CARDIOVASCULAR: Regular rate and rhythm; No murmurs; no peripheral edema  GI: Soft, nontender, non distended, normal bowel sounds  SKIN: Dry, intact, No rashes or lesions  MUSCULOSKELETAL: Full range of motion, normal strength  NEURO: sensation intact, extraocular movements intact, no tremor, normal reflexes  PSYCH: Alert and oriented x 3, normal affect, normal mood  CUSHING'S SIGNS: no striae    POCT Blood Glucose.: 175 mg/dL (01-21-18 @ 12:27)  POCT Blood Glucose.: 195 mg/dL (01-21-18 @ 09:01) H3+1    POCT Blood Glucose.: 300 mg/dL (01-20-18 @ 21:20) Lantus 15  POCT Blood Glucose.: 243 mg/dL (01-20-18 @ 17:50) H2+3  POCT Blood Glucose.: 300 mg/dL (01-20-18 @ 12:34) H3+2  POCT Blood Glucose.: 211 mg/dL (01-20-18 @ 10:35)  POCT Blood Glucose.: 307 mg/dL (01-19-18 @ 23:03)  POCT Blood Glucose.: 260 mg/dL (01-19-18 @ 19:52)                            14.8   8.71  )-----------( 287      ( 19 Jan 2018 17:33 )             43.3       01-20    139  |  102  |  15  ----------------------------<  260<H>  4.6   |  23  |  0.44<L>    EGFR if : 157  EGFR if non : 135    Ca    8.9      01-20  Mg     2.1     01-20    TPro  7.4  /  Alb  3.9  /  TBili  0.7  /  DBili  x   /  AST  22  /  ALT  20  /  AlkPhos  45  01-19    Hemoglobin A1C, Whole Blood: 11.7: (01.20.18 @ 06:21) Reason For Consult: Uncontrolled T2DM    HPI: 32F hx of L4/L5 herniated disc, Diabetes, presents to [facility] ED c/o severe right sided 10/10 RLE pain. Pain starts at her right buttocks and radiates down to her right foot. It is sharp in quality and is 10/10 starting today. The first time she had sciatica pain was October 2017, and since then she has been maintaining herself with PT and PRN Percocet. Today her pain is worse, not alleviated by percocet and nothing provocative in particular. She denies feeling concomitant fevers or chills. She initially went to [facility] ER but was discharged. Because the pain was so severe affecting her walking she came to [facility] ED.    In the ED patient was given 1g IV Tylenol (14:00), Toradol 15 mg IV x 1 (15:00), Valium 2mg IV x 1 (16:00), and Morphine 4 mg IV x 1 (16:00). (19 Jan 2018 18:35)    Endocrine History:  33 y/o female diagnosed with T2DM at age 17 when she was obese. Complicated with neuropathy. Last ophtho visit was 3-4 years ago. Admits to polyuria and polydipsia, wears Rx eye glasses. No h/o DKA or hospital admissions. Initially at age 17, intentionally lost weight and in 2007 was started on oral agents. She was on Metformin 1000 mg BID x few years, then Glipizide, then Janumet for few years. She was on Lantus pens + orals 2013 - 2016, then was only on orals and stopped taking that 1- 1.5 yrs ago as she got tired of managing her conditions and taking pills. I also suspect non-adherence to medications over the course of several years. She has a glucometer and only checks once/month.    She works as a  and works from 12p-8pm. Sleeps between 1-4am. Has twins that are a few months old and a 2-year old child. She usually awakens at 6-7 am, manages her kids, eats at noon and usually has a small meal, bread, eggs. Drinks diet soda and water through the day. Next meal is 9pm and has pasta, chicken, vegetables. No cakes, cookies, alcohol.    No history of HTN or HLD    PAST MEDICAL & SURGICAL HISTORY:  Diabetes  No significant past surgical history      FAMILY HISTORY:  Parents with T2DM  Brother at age 25 had CABG      Social History: NO tobacco, alcohol, illicit drug use    Outpatient Medications:  Home Medications:   * Patient Currently Takes Medications as of 19-Jan-2018 04:01 documented in Structured Notes  · 	Valium 5 mg oral tablet: 1 tab(s) orally 3 times a day  as needed for spasms MDD:3      MEDICATIONS  (STANDING):  dextrose 5%. 1000 milliLiter(s) (50 mL/Hr) IV Continuous <Continuous>  dextrose 50% Injectable 12.5 Gram(s) IV Push once  dextrose 50% Injectable 25 Gram(s) IV Push once  dextrose 50% Injectable 25 Gram(s) IV Push once  docusate sodium 100 milliGRAM(s) Oral two times a day  gabapentin 100 milliGRAM(s) Oral three times a day  influenza   Vaccine 0.5 milliLiter(s) IntraMuscular once  insulin glargine Injectable (LANTUS) 15 Unit(s) SubCutaneous at bedtime  insulin lispro (HumaLOG) corrective regimen sliding scale   SubCutaneous three times a day before meals  insulin lispro (HumaLOG) corrective regimen sliding scale   SubCutaneous at bedtime  insulin lispro Injectable (HumaLOG) 3 Unit(s) SubCutaneous three times a day before meals  ketorolac   Injectable 30 milliGRAM(s) IV Push every 8 hours    MEDICATIONS  (PRN):  cyclobenzaprine 10 milliGRAM(s) Oral three times a day PRN Muscle Spasm  dextrose Gel 1 Dose(s) Oral once PRN Blood Glucose LESS THAN 70 milliGRAM(s)/deciliter  glucagon  Injectable 1 milliGRAM(s) IntraMuscular once PRN Glucose LESS THAN 70 milligrams/deciliter  morphine  - Injectable 4 milliGRAM(s) IV Push every 6 hours PRN Severe Pain (7 - 10)      Allergies  No Known Allergies    Review of Systems:  Constitutional: No fever  Eyes: No blurry vision  Neuro: + radiating back pain  HEENT: No pain  Cardiovascular: No chest pain, palpitations  Respiratory: No SOB, no cough  GI: No nausea, vomiting, abdominal pain  : No dysuria  Skin: no rash  Psych: no depression  Endocrine: + polyuria, polydipsia  Hem/lymph: no swelling  Osteoporosis: no fractures    ALL OTHER SYSTEMS REVIEWED AND NEGATIVE    PHYSICAL EXAM:  VITALS: T(C): 36.8 (01-21-18 @ 05:09)  T(F): 98.3 (01-21-18 @ 05:09), Max: 99.1 (01-20-18 @ 20:42)  HR: 70 (01-21-18 @ 06:52) (68 - 98)  BP: 111/67 (01-21-18 @ 06:52) (102/58 - 111/67)  RR:  (17 - 19)  SpO2:  (97% - 99%)  Wt(kg): 74.8 kg    GENERAL: NAD, well-groomed, well-developed, supine in distress due to back pain, has ice packs on back  EYES: No proptosis, no lid lag, anicteric  HEENT:  Atraumatic, Normocephalic, moist mucous membranes  THYROID: Normal size, no palpable nodules, limited exam in supine position  RESPIRATORY: Clear to auscultation bilaterally; No rales, rhonchi, wheezing, or rubs  CARDIOVASCULAR: Regular rate and rhythm; No murmurs; no peripheral edema  GI: Soft, nontender, non distended, normal bowel sounds  SKIN: Dry, intact, No rashes or lesions  MUSCULOSKELETAL: Full range of motion, normal strength  NEURO: sensation intact, extraocular movements intact, no tremor, normal reflexes  PSYCH: Alert and oriented x 3, normal affect, normal mood  CUSHING'S SIGNS: no striae    POCT Blood Glucose.: 175 mg/dL (01-21-18 @ 12:27)  POCT Blood Glucose.: 195 mg/dL (01-21-18 @ 09:01) H3+1    POCT Blood Glucose.: 300 mg/dL (01-20-18 @ 21:20) Lantus 15  POCT Blood Glucose.: 243 mg/dL (01-20-18 @ 17:50) H2+3  POCT Blood Glucose.: 300 mg/dL (01-20-18 @ 12:34) H3+2  POCT Blood Glucose.: 211 mg/dL (01-20-18 @ 10:35)  POCT Blood Glucose.: 307 mg/dL (01-19-18 @ 23:03)  POCT Blood Glucose.: 260 mg/dL (01-19-18 @ 19:52)                            14.8   8.71  )-----------( 287      ( 19 Jan 2018 17:33 )             43.3       01-20    139  |  102  |  15  ----------------------------<  260<H>  4.6   |  23  |  0.44<L>    EGFR if : 157  EGFR if non : 135    Ca    8.9      01-20  Mg     2.1     01-20    TPro  7.4  /  Alb  3.9  /  TBili  0.7  /  DBili  x   /  AST  22  /  ALT  20  /  AlkPhos  45  01-19    Hemoglobin A1C, Whole Blood: 11.7: (01.20.18 @ 06:21)

## 2018-01-21 NOTE — PROGRESS NOTE ADULT - PROBLEM SELECTOR PLAN 3
uncontrolled with high a1c, endo consult appreciated, insulin adjusted, diabetes education, monitor glucose closely now that pt is on steroids.

## 2018-01-21 NOTE — PROGRESS NOTE ADULT - SUBJECTIVE AND OBJECTIVE BOX
Patient is a 32y old  Female who presents with a chief complaint of Sciatica flare (19 Jan 2018 18:35)      SUBJECTIVE / OVERNIGHT EVENTS: Pt was seen and examined at 1250pm, no overnight events, still c/o low back pain radiating to the RLE, gets pain relief with toradol but pain comes back after the medicine effects wear off per pt, no other complaints.    MEDICATIONS  (STANDING):  dextrose 5%. 1000 milliLiter(s) (50 mL/Hr) IV Continuous <Continuous>  dextrose 50% Injectable 12.5 Gram(s) IV Push once  dextrose 50% Injectable 25 Gram(s) IV Push once  dextrose 50% Injectable 25 Gram(s) IV Push once  docusate sodium 100 milliGRAM(s) Oral two times a day  gabapentin 100 milliGRAM(s) Oral three times a day  influenza   Vaccine 0.5 milliLiter(s) IntraMuscular once  insulin glargine Injectable (LANTUS) 20 Unit(s) SubCutaneous at bedtime  insulin lispro (HumaLOG) corrective regimen sliding scale   SubCutaneous three times a day before meals  insulin lispro (HumaLOG) corrective regimen sliding scale   SubCutaneous at bedtime  insulin lispro Injectable (HumaLOG) 5 Unit(s) SubCutaneous three times a day before meals  ketorolac   Injectable 30 milliGRAM(s) IV Push every 8 hours  predniSONE   Tablet 40 milliGRAM(s) Oral daily    MEDICATIONS  (PRN):  cyclobenzaprine 10 milliGRAM(s) Oral three times a day PRN Muscle Spasm  dextrose Gel 1 Dose(s) Oral once PRN Blood Glucose LESS THAN 70 milliGRAM(s)/deciliter  glucagon  Injectable 1 milliGRAM(s) IntraMuscular once PRN Glucose LESS THAN 70 milligrams/deciliter  morphine  - Injectable 4 milliGRAM(s) IV Push every 6 hours PRN Severe Pain (7 - 10)      Vital Signs Last 24 Hrs  T(C): 36.8 (21 Jan 2018 05:09), Max: 37.3 (20 Jan 2018 20:42)  T(F): 98.3 (21 Jan 2018 05:09), Max: 99.1 (20 Jan 2018 20:42)  HR: 70 (21 Jan 2018 06:52) (68 - 98)  BP: 111/67 (21 Jan 2018 06:52) (102/58 - 111/67)  BP(mean): --  RR: 18 (21 Jan 2018 05:09) (17 - 19)  SpO2: 97% (21 Jan 2018 05:09) (97% - 99%)  CAPILLARY BLOOD GLUCOSE      POCT Blood Glucose.: 175 mg/dL (21 Jan 2018 12:27)  POCT Blood Glucose.: 195 mg/dL (21 Jan 2018 09:01)  POCT Blood Glucose.: 300 mg/dL (20 Jan 2018 21:20)  POCT Blood Glucose.: 243 mg/dL (20 Jan 2018 17:50)    I&O's Summary      PHYSICAL EXAM:  GENERAL: NAD, well-developed  CHEST/LUNG: Clear to auscultation bilaterally; No wheeze  HEART: Regular rate and rhythm;  ABDOMEN: Soft, Nontender, Nondistended  EXTREMITIES:  no LE edema, Low back ttp,   PSYCH: calm  NEUROLOGY: AAOx3  SKIN: No rashes or lesions    LABS:                        14.8   8.71  )-----------( 287      ( 19 Jan 2018 17:33 )             43.3     01-20    139  |  102  |  15  ----------------------------<  260<H>  4.6   |  23  |  0.44<L>    Ca    8.9      20 Jan 2018 06:21  Mg     2.1     01-20    TPro  7.4  /  Alb  3.9  /  TBili  0.7  /  DBili  x   /  AST  22  /  ALT  20  /  AlkPhos  45  01-19              RADIOLOGY & ADDITIONAL TESTS:    Imaging Personally Reviewed:    Consultant(s) Notes Reviewed:      Care Discussed with Consultants/Other Providers:

## 2018-01-21 NOTE — PROGRESS NOTE ADULT - PROBLEM SELECTOR PLAN 2
Acute on Chronic Sciatica refractory to Percocet at home x 1 day.   - Flexaril 10 mg PO TID PRN Muscle Spasm, d/c ibuprofen, cont toradol and neurontin, cont Morphine 4 mg IV q6h PRN Breakthrough Pain, added prednisone short course   Ortho consult appreciated, no surgical intervention  - PT Consult  - s/p MRI of Lumbar Spine results reviewed, Disc herniation at the L4-L5 level with displacement and impingement of the descending right L5 nerve roots.  appreciate ortho consult

## 2018-01-22 DIAGNOSIS — E11.65 TYPE 2 DIABETES MELLITUS WITH HYPERGLYCEMIA: ICD-10-CM

## 2018-01-22 LAB
BUN SERPL-MCNC: 23 MG/DL — SIGNIFICANT CHANGE UP (ref 7–23)
CALCIUM SERPL-MCNC: 9 MG/DL — SIGNIFICANT CHANGE UP (ref 8.4–10.5)
CHLORIDE SERPL-SCNC: 97 MMOL/L — LOW (ref 98–107)
CO2 SERPL-SCNC: 22 MMOL/L — SIGNIFICANT CHANGE UP (ref 22–31)
CREAT SERPL-MCNC: 0.52 MG/DL — SIGNIFICANT CHANGE UP (ref 0.5–1.3)
GLUCOSE BLDC GLUCOMTR-MCNC: 242 MG/DL — HIGH (ref 70–99)
GLUCOSE BLDC GLUCOMTR-MCNC: 321 MG/DL — HIGH (ref 70–99)
GLUCOSE BLDC GLUCOMTR-MCNC: 322 MG/DL — HIGH (ref 70–99)
GLUCOSE BLDC GLUCOMTR-MCNC: 353 MG/DL — HIGH (ref 70–99)
GLUCOSE SERPL-MCNC: 324 MG/DL — HIGH (ref 70–99)
HCT VFR BLD CALC: 40.1 % — SIGNIFICANT CHANGE UP (ref 34.5–45)
HGB BLD-MCNC: 14.2 G/DL — SIGNIFICANT CHANGE UP (ref 11.5–15.5)
MCHC RBC-ENTMCNC: 31.1 PG — SIGNIFICANT CHANGE UP (ref 27–34)
MCHC RBC-ENTMCNC: 35.4 % — SIGNIFICANT CHANGE UP (ref 32–36)
MCV RBC AUTO: 87.7 FL — SIGNIFICANT CHANGE UP (ref 80–100)
NRBC # FLD: 0 — SIGNIFICANT CHANGE UP
PLATELET # BLD AUTO: 273 K/UL — SIGNIFICANT CHANGE UP (ref 150–400)
PMV BLD: 9.3 FL — SIGNIFICANT CHANGE UP (ref 7–13)
POTASSIUM SERPL-MCNC: 4.3 MMOL/L — SIGNIFICANT CHANGE UP (ref 3.5–5.3)
POTASSIUM SERPL-SCNC: 4.3 MMOL/L — SIGNIFICANT CHANGE UP (ref 3.5–5.3)
RBC # BLD: 4.57 M/UL — SIGNIFICANT CHANGE UP (ref 3.8–5.2)
RBC # FLD: 11.1 % — SIGNIFICANT CHANGE UP (ref 10.3–14.5)
SODIUM SERPL-SCNC: 132 MMOL/L — LOW (ref 135–145)
WBC # BLD: 10.98 K/UL — HIGH (ref 3.8–10.5)
WBC # FLD AUTO: 10.98 K/UL — HIGH (ref 3.8–10.5)

## 2018-01-22 PROCEDURE — 99232 SBSQ HOSP IP/OBS MODERATE 35: CPT

## 2018-01-22 PROCEDURE — 99233 SBSQ HOSP IP/OBS HIGH 50: CPT

## 2018-01-22 RX ORDER — LIDOCAINE 4 G/100G
1 CREAM TOPICAL DAILY
Qty: 0 | Refills: 0 | Status: DISCONTINUED | OUTPATIENT
Start: 2018-01-22 | End: 2018-01-23

## 2018-01-22 RX ORDER — INSULIN LISPRO 100/ML
VIAL (ML) SUBCUTANEOUS
Qty: 0 | Refills: 0 | Status: DISCONTINUED | OUTPATIENT
Start: 2018-01-22 | End: 2018-01-23

## 2018-01-22 RX ORDER — MORPHINE SULFATE 50 MG/1
15 CAPSULE, EXTENDED RELEASE ORAL EVERY 8 HOURS
Qty: 0 | Refills: 0 | Status: DISCONTINUED | OUTPATIENT
Start: 2018-01-22 | End: 2018-01-23

## 2018-01-22 RX ORDER — INSULIN LISPRO 100/ML
9 VIAL (ML) SUBCUTANEOUS
Qty: 0 | Refills: 0 | Status: DISCONTINUED | OUTPATIENT
Start: 2018-01-22 | End: 2018-01-23

## 2018-01-22 RX ORDER — ACETAMINOPHEN 500 MG
650 TABLET ORAL EVERY 8 HOURS
Qty: 0 | Refills: 0 | Status: DISCONTINUED | OUTPATIENT
Start: 2018-01-22 | End: 2018-01-23

## 2018-01-22 RX ORDER — INSULIN LISPRO 100/ML
VIAL (ML) SUBCUTANEOUS AT BEDTIME
Qty: 0 | Refills: 0 | Status: DISCONTINUED | OUTPATIENT
Start: 2018-01-22 | End: 2018-01-23

## 2018-01-22 RX ORDER — HYDROMORPHONE HYDROCHLORIDE 2 MG/ML
0.5 INJECTION INTRAMUSCULAR; INTRAVENOUS; SUBCUTANEOUS ONCE
Qty: 0 | Refills: 0 | Status: DISCONTINUED | OUTPATIENT
Start: 2018-01-22 | End: 2018-01-22

## 2018-01-22 RX ORDER — MORPHINE SULFATE 50 MG/1
15 CAPSULE, EXTENDED RELEASE ORAL EVERY 8 HOURS
Qty: 0 | Refills: 0 | Status: DISCONTINUED | OUTPATIENT
Start: 2018-01-22 | End: 2018-01-22

## 2018-01-22 RX ORDER — INSULIN GLARGINE 100 [IU]/ML
22 INJECTION, SOLUTION SUBCUTANEOUS AT BEDTIME
Qty: 0 | Refills: 0 | Status: DISCONTINUED | OUTPATIENT
Start: 2018-01-22 | End: 2018-01-23

## 2018-01-22 RX ORDER — GABAPENTIN 400 MG/1
300 CAPSULE ORAL THREE TIMES A DAY
Qty: 0 | Refills: 0 | Status: DISCONTINUED | OUTPATIENT
Start: 2018-01-22 | End: 2018-01-23

## 2018-01-22 RX ADMIN — Medication 650 MILLIGRAM(S): at 14:54

## 2018-01-22 RX ADMIN — Medication 4: at 22:37

## 2018-01-22 RX ADMIN — Medication 5 UNIT(S): at 09:04

## 2018-01-22 RX ADMIN — Medication 8: at 18:24

## 2018-01-22 RX ADMIN — Medication 9 UNIT(S): at 12:59

## 2018-01-22 RX ADMIN — MORPHINE SULFATE 15 MILLIGRAM(S): 50 CAPSULE, EXTENDED RELEASE ORAL at 18:22

## 2018-01-22 RX ADMIN — Medication 3: at 09:04

## 2018-01-22 RX ADMIN — Medication 650 MILLIGRAM(S): at 22:40

## 2018-01-22 RX ADMIN — Medication 100 MILLIGRAM(S): at 06:08

## 2018-01-22 RX ADMIN — Medication 40 MILLIGRAM(S): at 06:09

## 2018-01-22 RX ADMIN — Medication 650 MILLIGRAM(S): at 15:30

## 2018-01-22 RX ADMIN — GABAPENTIN 300 MILLIGRAM(S): 400 CAPSULE ORAL at 22:41

## 2018-01-22 RX ADMIN — Medication 650 MILLIGRAM(S): at 23:40

## 2018-01-22 RX ADMIN — CYCLOBENZAPRINE HYDROCHLORIDE 10 MILLIGRAM(S): 10 TABLET, FILM COATED ORAL at 03:59

## 2018-01-22 RX ADMIN — Medication 9 UNIT(S): at 18:23

## 2018-01-22 RX ADMIN — Medication 30 MILLIGRAM(S): at 15:11

## 2018-01-22 RX ADMIN — Medication 30 MILLIGRAM(S): at 14:54

## 2018-01-22 RX ADMIN — HYDROMORPHONE HYDROCHLORIDE 0.5 MILLIGRAM(S): 2 INJECTION INTRAMUSCULAR; INTRAVENOUS; SUBCUTANEOUS at 11:56

## 2018-01-22 RX ADMIN — Medication 10: at 12:59

## 2018-01-22 RX ADMIN — PANTOPRAZOLE SODIUM 40 MILLIGRAM(S): 20 TABLET, DELAYED RELEASE ORAL at 06:40

## 2018-01-22 RX ADMIN — MORPHINE SULFATE 15 MILLIGRAM(S): 50 CAPSULE, EXTENDED RELEASE ORAL at 19:00

## 2018-01-22 RX ADMIN — GABAPENTIN 100 MILLIGRAM(S): 400 CAPSULE ORAL at 06:09

## 2018-01-22 RX ADMIN — GABAPENTIN 300 MILLIGRAM(S): 400 CAPSULE ORAL at 14:54

## 2018-01-22 RX ADMIN — LIDOCAINE 1 PATCH: 4 CREAM TOPICAL at 13:12

## 2018-01-22 RX ADMIN — MORPHINE SULFATE 4 MILLIGRAM(S): 50 CAPSULE, EXTENDED RELEASE ORAL at 06:20

## 2018-01-22 RX ADMIN — INSULIN GLARGINE 22 UNIT(S): 100 INJECTION, SOLUTION SUBCUTANEOUS at 22:37

## 2018-01-22 RX ADMIN — Medication 30 MILLIGRAM(S): at 06:08

## 2018-01-22 RX ADMIN — MORPHINE SULFATE 4 MILLIGRAM(S): 50 CAPSULE, EXTENDED RELEASE ORAL at 06:03

## 2018-01-22 RX ADMIN — HYDROMORPHONE HYDROCHLORIDE 0.5 MILLIGRAM(S): 2 INJECTION INTRAMUSCULAR; INTRAVENOUS; SUBCUTANEOUS at 12:11

## 2018-01-22 NOTE — PROGRESS NOTE ADULT - PROBLEM SELECTOR PLAN 3
appreciate endo, increase basal, bolus per recs, hyperglycemia partly steroid-driven, titrating down.  hyponatremia, - dilutional due to hyperglycemia

## 2018-01-22 NOTE — PROVIDER CONTACT NOTE (MEDICATION) - BACKGROUND
Patient admitted for worsening pain in back radiating to bilateral lower extremities, history of sciatica.

## 2018-01-22 NOTE — PROGRESS NOTE ADULT - ASSESSMENT
33 y/o female with uncontrolled T2DM with neuropathy here with severe radiating low back pain and L4/L5 herniated disc, not on DM meds for past 2 years at home and here with A1c 11.7.  Now on MDI in setting of worsening BG control related to prednisone use.

## 2018-01-22 NOTE — PROGRESS NOTE ADULT - PROBLEM SELECTOR PLAN 2
Acute on Chronic Sciatica refractory to Percocet at home x 1 day.   will change IV morphine to PO MSIR 15 mg q8 PRN for longer lasting control, cw flexeril, increase Neurontin to 300 mg TID, DC Toradol to avoid nephrotoxicity and change Tylenol to standing. titrate prednisone to 20mg and reduce as tolerated. patient agreeable to these changes. she has out-pt PT already set up,, explained to her PT recs for rehab, she is undecided. out-pt ortho / spine follow up, no surgical interventions.

## 2018-01-22 NOTE — PROGRESS NOTE ADULT - ASSESSMENT
32F being admitted for intractable sciatica pain, found with uncontrolled Diabetes Mellitus with hyperglycemia

## 2018-01-22 NOTE — PROGRESS NOTE ADULT - SUBJECTIVE AND OBJECTIVE BOX
Chief Complaint: type 2 diabetes     Interval history: Pt is feeling a bit improved after starting steroids for her herniated disc.  States she is able to more easily sit up, go to the bathroom, etc without profound pain.  Appetite was previosuly poor, but after starting steroids is picking up she states.  She received 40 mg prednisone yesterday evening, then another dose of 40 mg this AM with plan to continue 40 mg prednisone qAM from this point on.  Sugars cullen from 100s to 400s in that setting and is on MDI.      MEDICATIONS  (STANDING):  acetaminophen   Tablet. 650 milliGRAM(s) Oral every 8 hours  dextrose 5%. 1000 milliLiter(s) (50 mL/Hr) IV Continuous <Continuous>  dextrose 50% Injectable 12.5 Gram(s) IV Push once  dextrose 50% Injectable 25 Gram(s) IV Push once  dextrose 50% Injectable 25 Gram(s) IV Push once  docusate sodium 100 milliGRAM(s) Oral two times a day  gabapentin 300 milliGRAM(s) Oral three times a day  influenza   Vaccine 0.5 milliLiter(s) IntraMuscular once  insulin glargine Injectable (LANTUS) 22 Unit(s) SubCutaneous at bedtime  insulin lispro (HumaLOG) corrective regimen sliding scale   SubCutaneous three times a day before meals  insulin lispro (HumaLOG) corrective regimen sliding scale   SubCutaneous at bedtime  insulin lispro Injectable (HumaLOG) 9 Unit(s) SubCutaneous three times a day before meals  ketorolac   Injectable 30 milliGRAM(s) IV Push every 8 hours  lidocaine   Patch 1 Patch Transdermal daily  pantoprazole    Tablet 40 milliGRAM(s) Oral before breakfast    MEDICATIONS  (PRN):  cyclobenzaprine 10 milliGRAM(s) Oral three times a day PRN Muscle Spasm  dextrose Gel 1 Dose(s) Oral once PRN Blood Glucose LESS THAN 70 milliGRAM(s)/deciliter  glucagon  Injectable 1 milliGRAM(s) IntraMuscular once PRN Glucose LESS THAN 70 milligrams/deciliter  morphine ER Tablet 15 milliGRAM(s) Oral every 8 hours PRN moderate to severe pain      Allergies    No Known Allergies    Intolerances      Review of Systems:  Skin: no rash  Endocrine: no polyuria, polydipsia    ALL OTHER SYSTEMS REVIEWED AND NEGATIVE    PHYSICAL EXAM:  VITALS: T(C): 37 (01-22-18 @ 06:00)  T(F): 98.6 (01-22-18 @ 06:00), Max: 99.1 (01-21-18 @ 17:00)  HR: 96 (01-22-18 @ 11:50) (79 - 125)  BP: 119/70 (01-22-18 @ 11:50) (110/58 - 132/71)  RR:  (16 - 20)  SpO2:  (96% - 99%)  Wt(kg): --  GENERAL: NAD, well-developed  EYES: No proptosis, sclera anicteric  HEENT:  Atraumatic, Normocephalic, moist mucous membranes  SKIN: Dry, intact, No diffuse rashes   PSYCH: Alert and oriented x 3, normal affect, normal mood    POCT Blood Glucose.: 284 mg/dL (01-22-18 @ 08:58)  POCT Blood Glucose.: 417 mg/dL (01-21-18 @ 21:10)  POCT Blood Glucose.: 272 mg/dL (01-21-18 @ 17:50)  POCT Blood Glucose.: 175 mg/dL (01-21-18 @ 12:27)  POCT Blood Glucose.: 195 mg/dL (01-21-18 @ 09:01)  POCT Blood Glucose.: 300 mg/dL (01-20-18 @ 21:20)  POCT Blood Glucose.: 243 mg/dL (01-20-18 @ 17:50)  POCT Blood Glucose.: 300 mg/dL (01-20-18 @ 12:34)  POCT Blood Glucose.: 211 mg/dL (01-20-18 @ 10:35)  POCT Blood Glucose.: 307 mg/dL (01-19-18 @ 23:03)  POCT Blood Glucose.: 260 mg/dL (01-19-18 @ 19:52)      01-22    132<L>  |  97<L>  |  23  ----------------------------<  324<H>  4.3   |  22  |  0.52    EGFR if : 149  EGFR if non : 128    Ca    9.0      01-22  Mg     2.1     01-20    TPro  7.4  /  Alb  3.9  /  TBili  0.7  /  DBili  x   /  AST  22  /  ALT  20  /  AlkPhos  45  01-19          Thyroid Function Tests:      Hemoglobin A1C, Whole Blood: 11.7 % <H> [4.0 - 5.6] (01-20-18 @ 06:21)

## 2018-01-22 NOTE — PROGRESS NOTE ADULT - PROBLEM SELECTOR PLAN 4
Low risk no VTE required  Dispo pending patient decision on rehab. DC planning likely for [facility] once pain is better controlled now on PO and FS improved.

## 2018-01-22 NOTE — PROVIDER CONTACT NOTE (MEDICATION) - ACTION/TREATMENT ORDERED:
Okay to give both Morphine IVP prn and standing Toradol IVP with BP currently 110/58 per ADS NP Scarlett Santiago' orders. Will continue to monitor patient.

## 2018-01-22 NOTE — PROVIDER CONTACT NOTE (MEDICATION) - ASSESSMENT
Patient audibly moaning in pain, stating pain is 10/10 on numerical scale. Requesting prn Morphine pain medication.

## 2018-01-22 NOTE — PROGRESS NOTE ADULT - PROBLEM SELECTOR PLAN 1
Had been off any DM meds prior to this admission with A1c 11.7, so really needs MDI on discharge anyway, but particularly now in setting of steroid use with steroid exacerabated hyperglycemia.     Here, would increase from 5 to 9 units of humalog TIDAC.  Would incrase from low to moderate SSI achs.  Would incrase from 20 to 22 units lantus qHS.   may need further increases going forward as well.     On discharge, will need lantus/humalog pens.  She has been on insulin pens in past, so knows how to use them.  D/c recs will depend on steroid dosing at time of discharge.    Our impression is that her steroids will be 40 mg prednisone qAM for at least 4 more days.  IF THIS PLAN CHANGES, PLEASE KEEP US UPDATED so we can alter insulin regimen. Had been off any DM meds prior to this admission with A1c 11.7, so really needs MDI on discharge anyway, but additionally now in setting of steroid use with steroid exacerabated hyperglycemia.     Here, would increase from 5 to 9 units of humalog TIDAC.  Would increase from low to moderate SSI achs.  Would increase from 20 to 22 units lantus qHS.   may need further adjustments going forward as well. These doses are based on current sugars and in the setting of having received dose of 40 mg pred last night and this morning, so again she may need doses reduced tomorrow.  However, left her on these doses even though her steroids will decrease to 20 mg qAM of pred daily for 1/23, 1/24, and 1/25 (then off) given that these are also reasonable weight based doses.      On discharge, will need lantus/humalog pens.  She has been on insulin pens in past, so knows how to use them.  D/c recs will depend on steroid dosing at time of discharge. She will need Rx for glucometer, test strips, lancets to check sugars up to 3x/day (brand dependent on insurance coverage).  Will need rx for BD pen needles to use 4 per day and will also need script for either lantus or basaglar pens (depending on insurance) and humalog or novolog pens (again insurance dependent).     Discussed steroid plan (above) with NP today.    If she'd like to follow up with us, our [facility] clinic number is [phone].

## 2018-01-22 NOTE — PROVIDER CONTACT NOTE (MEDICATION) - RECOMMENDATIONS
Medicate patient in order to relieve pain and use other nonpharmacological strategies to alleviate pain.

## 2018-01-22 NOTE — PROGRESS NOTE ADULT - SUBJECTIVE AND OBJECTIVE BOX
Patient is a 32y old  Female who presents with a chief complaint of Sciatica flare (19 Jan 2018 18:35)      SUBJECTIVE / OVERNIGHT EVENTS: states pain is uncontrolled, 10/10, radiating down leg. morphine helps initially but wears off.     ROS:  No HA/DZ  No Vision changes   No CP, SOB  No N/V/D  No Edema  No Rash  NO weakness, numbness    MEDICATIONS  (STANDING):  acetaminophen   Tablet. 650 milliGRAM(s) Oral every 8 hours  dextrose 5%. 1000 milliLiter(s) (50 mL/Hr) IV Continuous <Continuous>  dextrose 50% Injectable 12.5 Gram(s) IV Push once  dextrose 50% Injectable 25 Gram(s) IV Push once  dextrose 50% Injectable 25 Gram(s) IV Push once  docusate sodium 100 milliGRAM(s) Oral two times a day  gabapentin 300 milliGRAM(s) Oral three times a day  influenza   Vaccine 0.5 milliLiter(s) IntraMuscular once  insulin glargine Injectable (LANTUS) 22 Unit(s) SubCutaneous at bedtime  insulin lispro (HumaLOG) corrective regimen sliding scale   SubCutaneous three times a day before meals  insulin lispro (HumaLOG) corrective regimen sliding scale   SubCutaneous at bedtime  insulin lispro Injectable (HumaLOG) 9 Unit(s) SubCutaneous three times a day before meals  ketorolac   Injectable 30 milliGRAM(s) IV Push every 8 hours  lidocaine   Patch 1 Patch Transdermal daily  pantoprazole    Tablet 40 milliGRAM(s) Oral before breakfast    MEDICATIONS  (PRN):  cyclobenzaprine 10 milliGRAM(s) Oral three times a day PRN Muscle Spasm  dextrose Gel 1 Dose(s) Oral once PRN Blood Glucose LESS THAN 70 milliGRAM(s)/deciliter  glucagon  Injectable 1 milliGRAM(s) IntraMuscular once PRN Glucose LESS THAN 70 milligrams/deciliter  morphine ER Tablet 15 milliGRAM(s) Oral every 8 hours PRN moderate to severe pain      T(C): 37 (01-22-18 @ 06:00)  HR: 96 (01-22-18 @ 11:50)  BP: 119/70 (01-22-18 @ 11:50)  RR: 20 (01-22-18 @ 11:50)  SpO2: 99% (01-22-18 @ 11:50)  CAPILLARY BLOOD GLUCOSE      POCT Blood Glucose.: 353 mg/dL (22 Jan 2018 12:54)  POCT Blood Glucose.: 284 mg/dL (22 Jan 2018 08:58)  POCT Blood Glucose.: 417 mg/dL (21 Jan 2018 21:10)  POCT Blood Glucose.: 272 mg/dL (21 Jan 2018 17:50)    I&O's Summary      PHYSICAL EXAM:  GENERAL: NAD, well-developed, AOx3  HEAD:  Atraumatic, Normocephalic  EYES: EOMI, PERRL, conjunctiva and sclera clear  NECK: Supple, No JVD  CHEST/LUNG: Clear to auscultation bilaterally  HEART: Regular rate and rhythm; No murmurs, rubs, or gallops, No Edema  ABDOMEN: Soft, Nontender, Nondistended; Bowel sounds present  EXTREMITIES:  2+ Peripheral Pulses, No clubbing, cyanosis  PSYCH: No SI/HI  NEUROLOGY: non-focal  SKIN: No rashes or lesions    LABS:                        14.2   10.98 )-----------( 273      ( 22 Jan 2018 05:35 )             40.1     01-22    132<L>  |  97<L>  |  23  ----------------------------<  324<H>  4.3   |  22  |  0.52    Ca    9.0      22 Jan 2018 05:35                    RADIOLOGY & ADDITIONAL TESTS:    Imaging Personally Reviewed:    Consultant(s) Notes Reviewed:      Care Discussed with Consultants/Other Providers:

## 2018-01-23 ENCOUNTER — TRANSCRIPTION ENCOUNTER (OUTPATIENT)
Age: 33
End: 2018-01-23

## 2018-01-23 ENCOUNTER — APPOINTMENT (OUTPATIENT)
Dept: ORTHOPEDIC SURGERY | Facility: CLINIC | Age: 33
End: 2018-01-23

## 2018-01-23 VITALS
TEMPERATURE: 98 F | HEART RATE: 91 BPM | SYSTOLIC BLOOD PRESSURE: 125 MMHG | OXYGEN SATURATION: 100 % | DIASTOLIC BLOOD PRESSURE: 66 MMHG | RESPIRATION RATE: 16 BRPM

## 2018-01-23 DIAGNOSIS — R69 ILLNESS, UNSPECIFIED: ICD-10-CM

## 2018-01-23 LAB
BUN SERPL-MCNC: 19 MG/DL — SIGNIFICANT CHANGE UP (ref 7–23)
CALCIUM SERPL-MCNC: 8.1 MG/DL — LOW (ref 8.4–10.5)
CHLORIDE SERPL-SCNC: 100 MMOL/L — SIGNIFICANT CHANGE UP (ref 98–107)
CO2 SERPL-SCNC: 23 MMOL/L — SIGNIFICANT CHANGE UP (ref 22–31)
CREAT SERPL-MCNC: 0.51 MG/DL — SIGNIFICANT CHANGE UP (ref 0.5–1.3)
GLUCOSE BLDC GLUCOMTR-MCNC: 307 MG/DL — HIGH (ref 70–99)
GLUCOSE BLDC GLUCOMTR-MCNC: 309 MG/DL — HIGH (ref 70–99)
GLUCOSE BLDC GLUCOMTR-MCNC: 379 MG/DL — HIGH (ref 70–99)
GLUCOSE SERPL-MCNC: 288 MG/DL — HIGH (ref 70–99)
HCT VFR BLD CALC: 38.4 % — SIGNIFICANT CHANGE UP (ref 34.5–45)
HGB BLD-MCNC: 13.2 G/DL — SIGNIFICANT CHANGE UP (ref 11.5–15.5)
MCHC RBC-ENTMCNC: 30.7 PG — SIGNIFICANT CHANGE UP (ref 27–34)
MCHC RBC-ENTMCNC: 34.4 % — SIGNIFICANT CHANGE UP (ref 32–36)
MCV RBC AUTO: 89.3 FL — SIGNIFICANT CHANGE UP (ref 80–100)
NRBC # FLD: 0 — SIGNIFICANT CHANGE UP
PLATELET # BLD AUTO: 256 K/UL — SIGNIFICANT CHANGE UP (ref 150–400)
PMV BLD: 9.4 FL — SIGNIFICANT CHANGE UP (ref 7–13)
POTASSIUM SERPL-MCNC: 4.3 MMOL/L — SIGNIFICANT CHANGE UP (ref 3.5–5.3)
POTASSIUM SERPL-SCNC: 4.3 MMOL/L — SIGNIFICANT CHANGE UP (ref 3.5–5.3)
RBC # BLD: 4.3 M/UL — SIGNIFICANT CHANGE UP (ref 3.8–5.2)
RBC # FLD: 11.3 % — SIGNIFICANT CHANGE UP (ref 10.3–14.5)
SODIUM SERPL-SCNC: 135 MMOL/L — SIGNIFICANT CHANGE UP (ref 135–145)
WBC # BLD: 9.44 K/UL — SIGNIFICANT CHANGE UP (ref 3.8–10.5)
WBC # FLD AUTO: 9.44 K/UL — SIGNIFICANT CHANGE UP (ref 3.8–10.5)

## 2018-01-23 PROCEDURE — 99232 SBSQ HOSP IP/OBS MODERATE 35: CPT

## 2018-01-23 PROCEDURE — 99239 HOSP IP/OBS DSCHRG MGMT >30: CPT

## 2018-01-23 RX ORDER — MORPHINE SULFATE 50 MG/1
1 CAPSULE, EXTENDED RELEASE ORAL
Qty: 9 | Refills: 0 | OUTPATIENT
Start: 2018-01-23 | End: 2018-01-25

## 2018-01-23 RX ORDER — GABAPENTIN 400 MG/1
1 CAPSULE ORAL
Qty: 90 | Refills: 0 | OUTPATIENT
Start: 2018-01-23 | End: 2018-02-21

## 2018-01-23 RX ORDER — DOCUSATE SODIUM 100 MG
1 CAPSULE ORAL
Qty: 0 | Refills: 0 | COMMUNITY
Start: 2018-01-23

## 2018-01-23 RX ORDER — ACETAMINOPHEN 500 MG
2 TABLET ORAL
Qty: 0 | Refills: 0 | COMMUNITY
Start: 2018-01-23

## 2018-01-23 RX ORDER — INSULIN LISPRO 100/ML
15 VIAL (ML) SUBCUTANEOUS
Qty: 5 | Refills: 0 | OUTPATIENT
Start: 2018-01-23 | End: 2018-02-21

## 2018-01-23 RX ORDER — INSULIN GLARGINE 100 [IU]/ML
27 INJECTION, SOLUTION SUBCUTANEOUS AT BEDTIME
Qty: 0 | Refills: 0 | Status: DISCONTINUED | OUTPATIENT
Start: 2018-01-23 | End: 2018-01-23

## 2018-01-23 RX ORDER — ISOPROPYL ALCOHOL, BENZOCAINE .7; .06 ML/ML; ML/ML
1 SWAB TOPICAL
Qty: 100 | Refills: 0 | OUTPATIENT
Start: 2018-01-23 | End: 2018-02-21

## 2018-01-23 RX ORDER — INSULIN LISPRO 100/ML
2 VIAL (ML) SUBCUTANEOUS
Qty: 0 | Refills: 0 | COMMUNITY
Start: 2018-01-23

## 2018-01-23 RX ORDER — INSULIN LISPRO 100/ML
18 VIAL (ML) SUBCUTANEOUS
Qty: 0 | Refills: 0 | COMMUNITY
Start: 2018-01-23 | End: 2018-02-21

## 2018-01-23 RX ORDER — INSULIN LISPRO 100/ML
15 VIAL (ML) SUBCUTANEOUS
Qty: 0 | Refills: 0 | Status: DISCONTINUED | OUTPATIENT
Start: 2018-01-23 | End: 2018-01-23

## 2018-01-23 RX ORDER — CYCLOBENZAPRINE HYDROCHLORIDE 10 MG/1
1 TABLET, FILM COATED ORAL
Qty: 15 | Refills: 0 | OUTPATIENT
Start: 2018-01-23 | End: 2018-01-27

## 2018-01-23 RX ORDER — ENOXAPARIN SODIUM 100 MG/ML
27 INJECTION SUBCUTANEOUS
Qty: 5 | Refills: 0 | OUTPATIENT
Start: 2018-01-23 | End: 2018-02-21

## 2018-01-23 RX ORDER — ENOXAPARIN SODIUM 100 MG/ML
28 INJECTION SUBCUTANEOUS
Qty: 0 | Refills: 0 | COMMUNITY
Start: 2018-01-23 | End: 2018-02-21

## 2018-01-23 RX ORDER — PANTOPRAZOLE SODIUM 20 MG/1
1 TABLET, DELAYED RELEASE ORAL
Qty: 30 | Refills: 0 | OUTPATIENT
Start: 2018-01-23 | End: 2018-02-21

## 2018-01-23 RX ADMIN — MORPHINE SULFATE 15 MILLIGRAM(S): 50 CAPSULE, EXTENDED RELEASE ORAL at 13:00

## 2018-01-23 RX ADMIN — Medication 9 UNIT(S): at 09:25

## 2018-01-23 RX ADMIN — GABAPENTIN 300 MILLIGRAM(S): 400 CAPSULE ORAL at 17:42

## 2018-01-23 RX ADMIN — Medication 15 UNIT(S): at 13:44

## 2018-01-23 RX ADMIN — PANTOPRAZOLE SODIUM 40 MILLIGRAM(S): 20 TABLET, DELAYED RELEASE ORAL at 06:26

## 2018-01-23 RX ADMIN — MORPHINE SULFATE 15 MILLIGRAM(S): 50 CAPSULE, EXTENDED RELEASE ORAL at 12:00

## 2018-01-23 RX ADMIN — Medication 650 MILLIGRAM(S): at 13:45

## 2018-01-23 RX ADMIN — LIDOCAINE 1 PATCH: 4 CREAM TOPICAL at 01:12

## 2018-01-23 RX ADMIN — Medication 650 MILLIGRAM(S): at 05:09

## 2018-01-23 RX ADMIN — CYCLOBENZAPRINE HYDROCHLORIDE 10 MILLIGRAM(S): 10 TABLET, FILM COATED ORAL at 06:26

## 2018-01-23 RX ADMIN — GABAPENTIN 300 MILLIGRAM(S): 400 CAPSULE ORAL at 05:09

## 2018-01-23 RX ADMIN — LIDOCAINE 1 PATCH: 4 CREAM TOPICAL at 12:00

## 2018-01-23 RX ADMIN — MORPHINE SULFATE 15 MILLIGRAM(S): 50 CAPSULE, EXTENDED RELEASE ORAL at 03:39

## 2018-01-23 RX ADMIN — MORPHINE SULFATE 15 MILLIGRAM(S): 50 CAPSULE, EXTENDED RELEASE ORAL at 02:39

## 2018-01-23 RX ADMIN — Medication 10: at 13:44

## 2018-01-23 RX ADMIN — Medication 650 MILLIGRAM(S): at 06:09

## 2018-01-23 RX ADMIN — Medication 8: at 09:25

## 2018-01-23 RX ADMIN — Medication 20 MILLIGRAM(S): at 05:10

## 2018-01-23 NOTE — DIETITIAN INITIAL EVALUATION ADULT. - NS AS NUTRI INTERV MEALS SNACK
Diets modified for specific foods and ingredients/Other (specify)/1. Suggest: PO diet rx: Consistent Carbohydrate (evening snack);             2. Monitor PO diet tolerance;           3. Monitor labs, weights, hydration status;             4. Suggest outpatient follow up with an endocrinologist to ensure long-term DM diet comprehension and compliance. Suggest outpatient follow up with appropriate RD for the purposes of long-term nutrition evaluation and diet education;

## 2018-01-23 NOTE — DISCHARGE NOTE ADULT - MEDICATION SUMMARY - MEDICATIONS TO STOP TAKING
I will STOP taking the medications listed below when I get home from the hospital:    Valium 5 mg oral tablet  -- 1 tab(s) by mouth 3 times a day  as needed for spasms MDD:3  -- Caution federal law prohibits the transfer of this drug to any person other  than the person for whom it was prescribed.  Do not take this drug if you are pregnant.  May cause drowsiness.  Alcohol may intensify this effect.  Use care when operating dangerous machinery.

## 2018-01-23 NOTE — DISCHARGE NOTE ADULT - PLAN OF CARE
remain free of pain please follow up with your pcp within 1 week of discharge.  Please call to make an appointment within 1 week of dsicharge.  Please continue steroids and pain meds as prescribed.  Please continue outpatient physical therapy.  Please follow up with orthopedics surgeon Dr. Long for possible steroid injections. Please call to make an appointment within 1 week of discharge. Maintain adequate glucose control Please monitor your fingersticks.  Please continue insulin regimen as prescibed.  Please Follow up at the [facility] .  Please call to make an appointment as soon as you are discharged.  would increase from 9 to 15 units of humalog TIDAC.  Would continue moderate SSI achs.  Would increase from 22 to 27 units lantus qHS.   These doses are specifically to be taken when pt is taking 20 mg prednisone in the morning on 1/23 and 1/24.    HOWEVER, when steroid doses are STOPPED on 1/26, will need to reduce her insulin doses as well to the following:  Lantus take 24 units every evening  Humalog take 8 units before meals; if you are skipping a meal, do NOT take this dose  Please follow up at the [facility] medical clinic [phone].  Please call to make an appointment within 1 week of discharge.

## 2018-01-23 NOTE — DISCHARGE NOTE ADULT - ADDITIONAL INSTRUCTIONS
would increase from 9 to 15 units of humalog TIDAC.  Would continue moderate SSI achs.  Would increase from 22 to 27 units lantus qHS.   These doses are specifically to be taken when pt is taking 20 mg prednisone in the morning on 1/23 and 1/24.    HOWEVER, when steroid doses are STOPPED on 1/26, will need to reduce her insulin doses as well to the following:  Lantus take 24 units every evening  Humalog take 8 units before meals; if you are skipping a meal, do NOT take this dose

## 2018-01-23 NOTE — PROGRESS NOTE ADULT - SUBJECTIVE AND OBJECTIVE BOX
Patient is a 32y old  Female who presents with a chief complaint of Sciatica flare (19 Jan 2018 18:35)      SUBJECTIVE / OVERNIGHT EVENTS: feels much better, up and walking across the porras, pain controlled.     ROS:  No HA/DZ  No Vision changes   No CP, SOB  No N/V/D  No Edema  No Rash  NO weakness, numbness    MEDICATIONS  (STANDING):  acetaminophen   Tablet. 650 milliGRAM(s) Oral every 8 hours  dextrose 5%. 1000 milliLiter(s) (50 mL/Hr) IV Continuous <Continuous>  dextrose 50% Injectable 12.5 Gram(s) IV Push once  dextrose 50% Injectable 25 Gram(s) IV Push once  dextrose 50% Injectable 25 Gram(s) IV Push once  docusate sodium 100 milliGRAM(s) Oral two times a day  gabapentin 300 milliGRAM(s) Oral three times a day  influenza   Vaccine 0.5 milliLiter(s) IntraMuscular once  insulin glargine Injectable (LANTUS) 22 Unit(s) SubCutaneous at bedtime  insulin lispro (HumaLOG) corrective regimen sliding scale   SubCutaneous three times a day before meals  insulin lispro (HumaLOG) corrective regimen sliding scale   SubCutaneous at bedtime  insulin lispro Injectable (HumaLOG) 9 Unit(s) SubCutaneous three times a day before meals  lidocaine   Patch 1 Patch Transdermal daily  pantoprazole    Tablet 40 milliGRAM(s) Oral before breakfast  predniSONE   Tablet 20 milliGRAM(s) Oral daily    MEDICATIONS  (PRN):  cyclobenzaprine 10 milliGRAM(s) Oral three times a day PRN Muscle Spasm  dextrose Gel 1 Dose(s) Oral once PRN Blood Glucose LESS THAN 70 milliGRAM(s)/deciliter  glucagon  Injectable 1 milliGRAM(s) IntraMuscular once PRN Glucose LESS THAN 70 milligrams/deciliter  morphine  IR 15 milliGRAM(s) Oral every 8 hours PRN moderate to severe pain      T(C): 36.6 (01-23-18 @ 05:01)  HR: 94 (01-23-18 @ 05:01)  BP: 110/63 (01-23-18 @ 05:01)  RR: 17 (01-23-18 @ 05:01)  SpO2: 97% (01-23-18 @ 05:01)  CAPILLARY BLOOD GLUCOSE      POCT Blood Glucose.: 309 mg/dL (23 Jan 2018 12:17)  POCT Blood Glucose.: 307 mg/dL (23 Jan 2018 09:12)  POCT Blood Glucose.: 321 mg/dL (22 Jan 2018 22:29)  POCT Blood Glucose.: 242 mg/dL (22 Jan 2018 21:23)  POCT Blood Glucose.: 322 mg/dL (22 Jan 2018 17:58)  POCT Blood Glucose.: 353 mg/dL (22 Jan 2018 12:54)    I&O's Summary      PHYSICAL EXAM:  GENERAL: NAD, well-developed, AOx3  HEAD:  Atraumatic, Normocephalic  EYES: EOMI, PERRL, conjunctiva and sclera clear  NECK: Supple, No JVD  CHEST/LUNG: Clear to auscultation bilaterally  HEART: Regular rate and rhythm; No murmurs, rubs, or gallops, No Edema  ABDOMEN: Soft, Nontender, Nondistended; Bowel sounds present  EXTREMITIES:  2+ Peripheral Pulses, No clubbing, cyanosis  PSYCH: No SI/HI  NEUROLOGY: non-focal  SKIN: No rashes or lesions    LABS:                        13.2   9.44  )-----------( 256      ( 23 Jan 2018 06:50 )             38.4     01-23    135  |  100  |  19  ----------------------------<  288<H>  4.3   |  23  |  0.51    Ca    8.1<L>      23 Jan 2018 06:50                    RADIOLOGY & ADDITIONAL TESTS:    Imaging Personally Reviewed:    Consultant(s) Notes Reviewed:      Care Discussed with Consultants/Other Providers:

## 2018-01-23 NOTE — PROGRESS NOTE ADULT - PROBLEM SELECTOR PLAN 2
much improved. now ambulating. DC home with MSIR PRN, Neurontin, Tylenol, Flexeril. limit prednisone total 4 days, out-pt PT and out-pt spine follow up

## 2018-01-23 NOTE — DISCHARGE NOTE ADULT - MEDICATION SUMMARY - MEDICATIONS TO TAKE
I will START or STAY ON the medications listed below when I get home from the hospital:    lancets  -- 1 unit(s) subcutaneous 4 times a day (before meals and at bedtime)   -- Indication: For diabetes mellitus     Glucometer  -- 1 unit(s) subcutaneous 4 times a day (before meals and at bedtime)   -- Indication: For diabetes mellitus     test strip  -- 1 unit(s) subcutaneous 4 times a day (before meals and at bedtime)   -- Indication: For diabetes mellitus     4mm maryann needles   -- 1 unit(s) subcutaneous 4 times a day (before meals and at bedtime)   -- Indication: For diabetes mellitus     alcohol swabs  -- 1 unit(s) subcutaneous 4 times a day (before meals and at bedtime)   -- Indication: For diabetes mellitus     predniSONE 20 mg oral tablet  -- 1 tab(s) by mouth once a day  -- Indication: For Sciatica    acetaminophen 325 mg oral tablet  -- 2 tab(s) by mouth every 8 hours, As Needed  -- Indication: For Pain    morphine 15 mg oral tablet  -- 1 tab(s) by mouth every 8 hours, As needed, moderate to severe pain MDD:30mg  -- Indication: For Pain    gabapentin 300 mg oral capsule  -- 1 cap(s) by mouth 3 times a day  -- Indication: For Pain    Lantus Solostar Pen 100 units/mL subcutaneous solution  -- 27 unit(s) subcutaneous once a day (at bedtime)   for 1/24 and 1/25 while on steroids   decrease 24units once a day at bedtime  on 1/26  -- Do not drink alcoholic beverages when taking this medication.  It is very important that you take or use this exactly as directed.  Do not skip doses or discontinue unless directed by your doctor.  Keep in refrigerator.  Do not freeze.    -- Indication: For diabetes mellitus     HumaLOG KwikPen 100 units/mL subcutaneous solution  -- 15 unit(s) subcutaneous 3 times a day (before meals) 1/24, 1/25 while on prednisone  on 1/26 decrease to 8units TID before meals   -- Do not drink alcoholic beverages when taking this medication.  It is very important that you take or use this exactly as directed.  Do not skip doses or discontinue unless directed by your doctor.  Keep in refrigerator.  Do not freeze.    -- Indication: For diabetes mellitus     insulin lispro 100 units/mL subcutaneous solution  -- 2 unit(s) subcutaneous 3 times a day (before meals) ; 2 Unit(s) if Glucose 151 - 200  4 Unit(s) if Glucose 201 - 250  6 Unit(s) if Glucose 251 - 300  8 Unit(s) if Glucose 301 - 350  10 Unit(s) if Glucose 351 - 400  12 Unit(s) if Glucose GREATER THAN 400  only for 1/24 and 1/25   -- Indication: For DIABETES MELLITUS    docusate sodium 100 mg oral capsule  -- 1 cap(s) by mouth 2 times a day  -- Indication: For BOWEL REGIMEN     cyclobenzaprine 10 mg oral tablet  -- 1 tab(s) by mouth 3 times a day, As needed, Muscle Spasm  -- Indication: For muscle relaxant     pantoprazole 40 mg oral delayed release tablet  -- 1 tab(s) by mouth once a day (before a meal)  -- Indication: For gerd

## 2018-01-23 NOTE — PROGRESS NOTE ADULT - PROBLEM SELECTOR PLAN 3
appreciate endo, increased basal, bolus per recs yesterday, FS better controlled. hyperglycemia partly steroid-driven, titrated down.  hyponatremia, - dilutional due to hyperglycemia, resolved  will obtain DC recs from endo

## 2018-01-23 NOTE — PROGRESS NOTE ADULT - SUBJECTIVE AND OBJECTIVE BOX
Chief Complaint: type 2 diabetes     Interval history: Pt states that her pain is still improved compared with pain at admission despite steroids being reduced from 40 mg daily to 20 mg daily.  Plan continues to be for 20 mg prednisone this AM (already given) then also on 1/24, and 1/25, then stop steroids.  Pt still amenable to doing insulin at d/c and again endorses feeling comfortable with pen use (has used these previously).      MEDICATIONS  (STANDING):  acetaminophen   Tablet. 650 milliGRAM(s) Oral every 8 hours  dextrose 5%. 1000 milliLiter(s) (50 mL/Hr) IV Continuous <Continuous>  dextrose 50% Injectable 12.5 Gram(s) IV Push once  dextrose 50% Injectable 25 Gram(s) IV Push once  dextrose 50% Injectable 25 Gram(s) IV Push once  docusate sodium 100 milliGRAM(s) Oral two times a day  gabapentin 300 milliGRAM(s) Oral three times a day  influenza   Vaccine 0.5 milliLiter(s) IntraMuscular once  insulin glargine Injectable (LANTUS) 22 Unit(s) SubCutaneous at bedtime  insulin lispro (HumaLOG) corrective regimen sliding scale   SubCutaneous three times a day before meals  insulin lispro (HumaLOG) corrective regimen sliding scale   SubCutaneous at bedtime  insulin lispro Injectable (HumaLOG) 9 Unit(s) SubCutaneous three times a day before meals  lidocaine   Patch 1 Patch Transdermal daily  pantoprazole    Tablet 40 milliGRAM(s) Oral before breakfast  predniSONE   Tablet 20 milliGRAM(s) Oral daily    MEDICATIONS  (PRN):  cyclobenzaprine 10 milliGRAM(s) Oral three times a day PRN Muscle Spasm  dextrose Gel 1 Dose(s) Oral once PRN Blood Glucose LESS THAN 70 milliGRAM(s)/deciliter  glucagon  Injectable 1 milliGRAM(s) IntraMuscular once PRN Glucose LESS THAN 70 milligrams/deciliter  morphine  IR 15 milliGRAM(s) Oral every 8 hours PRN moderate to severe pain      Allergies    No Known Allergies    Intolerances      Review of Systems:  Skin: no rash  Endocrine: no polyuria, polydipsia    ALL OTHER SYSTEMS REVIEWED AND NEGATIVE    PHYSICAL EXAM:  VITALS: T(C): 36.6 (01-23-18 @ 05:01)  T(F): 97.8 (01-23-18 @ 05:01), Max: 98 (01-23-18 @ 02:39)  HR: 94 (01-23-18 @ 05:01) (61 - 94)  BP: 110/63 (01-23-18 @ 05:01) (110/63 - 117/77)  RR:  (17 - 17)  SpO2:  (97% - 98%)  Wt(kg): --  GENERAL: NAD, well-developed  EYES: No proptosis, sclera anicteric  HEENT:  Atraumatic, Normocephalic, moist mucous membranes  SKIN: Dry, intact, No diffuse rashes   PSYCH: Alert and oriented x 3, normal affect, normal mood    POCT Blood Glucose.: 309 mg/dL (01-23-18 @ 12:17)  POCT Blood Glucose.: 307 mg/dL (01-23-18 @ 09:12)  POCT Blood Glucose.: 321 mg/dL (01-22-18 @ 22:29)  POCT Blood Glucose.: 242 mg/dL (01-22-18 @ 21:23)  POCT Blood Glucose.: 322 mg/dL (01-22-18 @ 17:58)  POCT Blood Glucose.: 353 mg/dL (01-22-18 @ 12:54)  POCT Blood Glucose.: 284 mg/dL (01-22-18 @ 08:58)  POCT Blood Glucose.: 417 mg/dL (01-21-18 @ 21:10)  POCT Blood Glucose.: 272 mg/dL (01-21-18 @ 17:50)  POCT Blood Glucose.: 175 mg/dL (01-21-18 @ 12:27)  POCT Blood Glucose.: 195 mg/dL (01-21-18 @ 09:01)  POCT Blood Glucose.: 300 mg/dL (01-20-18 @ 21:20)  POCT Blood Glucose.: 243 mg/dL (01-20-18 @ 17:50)      01-23    135  |  100  |  19  ----------------------------<  288<H>  4.3   |  23  |  0.51    EGFR if : 150  EGFR if non : 129    Ca    8.1<L>      01-23            Thyroid Function Tests:      Hemoglobin A1C, Whole Blood: 11.7 % <H> [4.0 - 5.6] (01-20-18 @ 06:21)

## 2018-01-23 NOTE — PROGRESS NOTE ADULT - PROBLEM SELECTOR PLAN 1
Had been off any DM meds prior to this admission with A1c 11.7, so really needs MDI on discharge anyway, but additionally now in setting of steroid use with steroid exacerbated hyperglycemia.     Here, would increase from 9 to 15 units of humalog TIDAC.  Would continue moderate SSI achs.  Would increase from 22 to 27 units lantus qHS.   These doses are specifically to be taken when pt is taking 20 mg prednisone in the morning on 1/23 and 1/24.    HOWEVER, when steroid doses are STOPPED on 1/26, will need to reduce her insulin doses as well to the following:  Lantus take 24 units every evening  Humalog take 8 units before meals; if you are skipping a meal, do NOT take this dose     On discharge, will need lantus/humalog pens.  She has been on insulin pens in past, so knows how to use them.  She will need Rx for glucometer, test strips, lancets to check sugars 4x/day (brand dependent on insurance coverage).  Will need rx for BD pen needles to use 4 per day and will also need script for either lantus or basaglar pens (depending on insurance) and humalog or novolog pens (again insurance dependent) at doses above.     Discussed steroid plan (above) with NP today.    If she'd like to follow up with us, our [facility] clinic number is [phone].

## 2018-01-23 NOTE — DIETITIAN INITIAL EVALUATION ADULT. - OTHER INFO
Nutrition Consult X very poor diet, T2DM. Pt 33 yo female appears alert, oriented. Per Pt her appetite good now, but her appetite was not well X 1 week. No chew/swallow problem voiced; no nausea/vomiting/diarrhea reported @ present. Per Pt her UBW: ~162#; no weight loss or weight changes voiced. Of note HbA1c level 11.7% (1/20). Pt usually avoids Regular sugar reported. Consistent Carbohydrate diet discussed with Pt including better food choices, foods to avoid; written materials on diet also provided. Pt showed not much interest about Consistent Carbohydrate diet education. Pt stated, "I'm with diabetes for long time." RDN remains available, Pt made aware.

## 2018-01-23 NOTE — PROGRESS NOTE ADULT - ASSESSMENT
31 y/o female with uncontrolled T2DM with neuropathy here with severe radiating low back pain and L4/L5 herniated disc, not on DM meds for past 2 years at home and here with A1c 11.7.  Now on MDI in setting of worsening BG control related to prednisone use.

## 2018-01-23 NOTE — DISCHARGE NOTE ADULT - PATIENT PORTAL LINK FT
“You can access the [facility] Patient Portal, offered by [facility], by registering with the following website: [email or web address]”

## 2018-01-23 NOTE — DISCHARGE NOTE ADULT - HOSPITAL COURSE
32F being admitted for intractable sciatica pain, found with uncontrolled Diabetes Mellitus with hyperglycemia        Problem/Plan - 1:  ·  Problem: Unable to ambulate.  Plan: now ambulating.      Problem/Plan - 2:  ·  Problem: Sciatica of right side.  Plan: much improved. now ambulating. DC home with MSIR PRN, Neurontin, Tylenol, Flexeril. limit prednisone total 4 days, out-pt PT and out-pt spine follow up.      Problem/Plan - 3:  ·  Problem: Type 2 diabetes mellitus without complication, without long-term current use of insulin.  Plan: appreciate endo, increased basal, bolus per recs yesterday, FS better controlled. hyperglycemia partly steroid-driven, titrated down.  hyponatremia, - dilutional due to hyperglycemia, resolved  insulin will be titrated down once off prednisone      Problem/Plan - 4:  ·  Problem: Prophylactic measure.  Plan: Low risk no VTE required  time spent on DC planning 35 min. 32F being admitted for intractable sciatica pain, found with uncontrolled Diabetes Mellitus with hyperglycemia       Sciatica of right side.   - Flexaril 10 mg PO TID PRN, Toradol 30 mg iv, and Morphine PRN Breakthrough Pain  - 1/20 started Neurontin; switched over to ms contin 15mg p.o. q 8hrs, and prednisone  - MRI L/S- Disc herniation at the L4-L5 level with displacement and impingement of the descending right L5 nerve roots.  - Ortho consulted Dr Long  - WBAT, PT: Rehab : pt refused; discharged with  out patient PT   - No acute surgical intervention    Type 2 diabetes mellitus without complication  - Endo consulted  - HgA1C- 11.7%  - Lantus, Humalog, c/w moderate sliding scale   -please follow up at [facility] clinic [phone]    DVT PPX   - Low risk no VTE required      Dispo: Home with outpt PT : pt refused rehab

## 2018-01-23 NOTE — DIETITIAN INITIAL EVALUATION ADULT. - PROBLEM SELECTOR PLAN 2
Acute on Chronic Sciatica refractory to Percocet at home x 1 day.   - Flexaril 10 mg PO TID PRN Muscle Spasm, Ibuprofen 600 mg PO q6h PRN Severe Pain, and Morphine 4 mg IV q6h PRN Breakthrough Pain  - Trend BMP daily  - PT Consult  - Follow up MRI of Lumbar Spine

## 2018-01-23 NOTE — DISCHARGE NOTE ADULT - CARE PLAN
Principal Discharge DX:	Sciatica of right side  Goal:	remain free of pain  Assessment and plan of treatment:	please follow up with your pcp within 1 week of discharge.  Please call to make an appointment within 1 week of dsicharge.  Please continue steroids and pain meds as prescribed.  Please continue outpatient physical therapy.  Please follow up with orthopedics surgeon Dr. Long for possible steroid injections. Please call to make an appointment within 1 week of discharge.  Secondary Diagnosis:	Type 2 diabetes mellitus with hyperglycemia, without long-term current use of insulin  Goal:	Maintain adequate glucose control  Assessment and plan of treatment:	Please monitor your fingersticks.  Please continue insulin regimen as prescibed.  Please Follow up at the [facility] .  Please call to make an appointment as soon as you are discharged.  would increase from 9 to 15 units of humalog TIDAC.  Would continue moderate SSI achs.  Would increase from 22 to 27 units lantus qHS.   These doses are specifically to be taken when pt is taking 20 mg prednisone in the morning on 1/23 and 1/24.    HOWEVER, when steroid doses are STOPPED on 1/26, will need to reduce her insulin doses as well to the following:  Lantus take 24 units every evening  Humalog take 8 units before meals; if you are skipping a meal, do NOT take this dose  Please follow up at the [facility] medical clinic [phone].  Please call to make an appointment within 1 week of discharge.

## 2018-01-30 ENCOUNTER — APPOINTMENT (OUTPATIENT)
Dept: ORTHOPEDIC SURGERY | Facility: CLINIC | Age: 33
End: 2018-01-30
Payer: MEDICAID

## 2018-01-30 PROCEDURE — 99214 OFFICE O/P EST MOD 30 MIN: CPT

## 2018-02-05 ENCOUNTER — APPOINTMENT (OUTPATIENT)
Dept: ENDOCRINOLOGY | Facility: CLINIC | Age: 33
End: 2018-02-05
Payer: MEDICAID

## 2018-02-05 ENCOUNTER — RESULT CHARGE (OUTPATIENT)
Age: 33
End: 2018-02-05

## 2018-02-05 VITALS — WEIGHT: 170 LBS | BODY MASS INDEX: 29.18 KG/M2

## 2018-02-05 LAB
GLUCOSE BLDC GLUCOMTR-MCNC: 258
HBA1C MFR BLD HPLC: 11.7

## 2018-02-05 PROCEDURE — 82962 GLUCOSE BLOOD TEST: CPT

## 2018-02-05 PROCEDURE — G0108 DIAB MANAGE TRN  PER INDIV: CPT

## 2018-02-05 RX ORDER — LANCETS
EACH MISCELLANEOUS
Qty: 1 | Refills: 0 | Status: ACTIVE | COMMUNITY
Start: 2018-02-05 | End: 1900-01-01

## 2018-02-06 ENCOUNTER — RX RENEWAL (OUTPATIENT)
Age: 33
End: 2018-02-06

## 2018-02-06 RX ORDER — INSULIN GLARGINE 100 [IU]/ML
100 INJECTION, SOLUTION SUBCUTANEOUS
Qty: 1 | Refills: 3 | Status: DISCONTINUED | COMMUNITY
Start: 2018-02-05 | End: 2018-02-06

## 2018-02-07 ENCOUNTER — CLINICAL ADVICE (OUTPATIENT)
Age: 33
End: 2018-02-07

## 2018-02-08 ENCOUNTER — RX RENEWAL (OUTPATIENT)
Age: 33
End: 2018-02-08

## 2018-02-08 RX ORDER — ISOPROPYL ALCOHOL 70 ML/100ML
SWAB TOPICAL
Qty: 2 | Refills: 2 | Status: ACTIVE | COMMUNITY
Start: 2018-02-08 | End: 1900-01-01

## 2018-02-09 ENCOUNTER — OUTPATIENT (OUTPATIENT)
Dept: OUTPATIENT SERVICES | Facility: HOSPITAL | Age: 33
LOS: 1 days | End: 2018-02-09
Payer: MEDICAID

## 2018-02-09 ENCOUNTER — LABORATORY RESULT (OUTPATIENT)
Age: 33
End: 2018-02-09

## 2018-02-09 ENCOUNTER — APPOINTMENT (OUTPATIENT)
Dept: INTERNAL MEDICINE | Facility: CLINIC | Age: 33
End: 2018-02-09
Payer: MEDICAID

## 2018-02-09 VITALS
HEART RATE: 90 BPM | HEIGHT: 64 IN | OXYGEN SATURATION: 99 % | WEIGHT: 168 LBS | BODY MASS INDEX: 28.68 KG/M2 | SYSTOLIC BLOOD PRESSURE: 118 MMHG | DIASTOLIC BLOOD PRESSURE: 70 MMHG

## 2018-02-09 DIAGNOSIS — I10 ESSENTIAL (PRIMARY) HYPERTENSION: ICD-10-CM

## 2018-02-09 DIAGNOSIS — Z00.00 ENCOUNTER FOR GENERAL ADULT MEDICAL EXAMINATION W/OUT ABNORMAL FINDINGS: ICD-10-CM

## 2018-02-09 PROCEDURE — 99203 OFFICE O/P NEW LOW 30 MIN: CPT | Mod: GE

## 2018-02-09 PROCEDURE — 90688 IIV4 VACCINE SPLT 0.5 ML IM: CPT

## 2018-02-09 PROCEDURE — G0008: CPT

## 2018-02-09 PROCEDURE — 82043 UR ALBUMIN QUANTITATIVE: CPT

## 2018-02-09 PROCEDURE — G0463: CPT

## 2018-02-09 RX ORDER — METHOCARBAMOL 500 MG/1
500 TABLET, FILM COATED ORAL
Refills: 0 | Status: ACTIVE | COMMUNITY
Start: 2018-02-09

## 2018-02-09 RX ORDER — TRAMADOL HYDROCHLORIDE 50 MG/1
50 TABLET, COATED ORAL
Qty: 60 | Refills: 2 | Status: ACTIVE | COMMUNITY
Start: 2018-02-09

## 2018-02-10 LAB
CREAT ?TM UR-MCNC: 74 MG/DL — SIGNIFICANT CHANGE UP
MICROALBUMIN UR-MCNC: 0.4 MG/DL — SIGNIFICANT CHANGE UP
MICROALBUMIN/CREAT UR-RTO: 5 MG/G — SIGNIFICANT CHANGE UP (ref 0–30)

## 2018-02-15 LAB
CHOLEST SERPL-MCNC: 166 MG/DL
CHOLEST/HDLC SERPL: 3.2 RATIO
HDLC SERPL-MCNC: 52 MG/DL
HIV1+2 AB SPEC QL IA.RAPID: NONREACTIVE
LDLC SERPL CALC-MCNC: 69 MG/DL
TRIGL SERPL-MCNC: 224 MG/DL

## 2018-02-21 ENCOUNTER — MEDICATION RENEWAL (OUTPATIENT)
Age: 33
End: 2018-02-21

## 2018-02-23 DIAGNOSIS — M54.16 RADICULOPATHY, LUMBAR REGION: ICD-10-CM

## 2018-02-23 DIAGNOSIS — E11.65 TYPE 2 DIABETES MELLITUS WITH HYPERGLYCEMIA: ICD-10-CM

## 2018-02-23 DIAGNOSIS — Z83.3 FAMILY HISTORY OF DIABETES MELLITUS: ICD-10-CM

## 2018-02-23 DIAGNOSIS — Z23 ENCOUNTER FOR IMMUNIZATION: ICD-10-CM

## 2018-03-23 ENCOUNTER — APPOINTMENT (OUTPATIENT)
Dept: ENDOCRINOLOGY | Facility: HOSPITAL | Age: 33
End: 2018-03-23

## 2018-03-23 ENCOUNTER — INPATIENT (INPATIENT)
Facility: HOSPITAL | Age: 33
LOS: 1 days | Discharge: HOME | DRG: 517 | End: 2018-03-25
Attending: ORTHOPAEDIC SURGERY | Admitting: ORTHOPAEDIC SURGERY
Payer: MEDICAID

## 2018-03-23 ENCOUNTER — EMERGENCY (EMERGENCY)
Facility: HOSPITAL | Age: 33
LOS: 1 days | Discharge: ANOTHER HOSPITAL | End: 2018-03-23
Admitting: EMERGENCY MEDICINE
Payer: MEDICAID

## 2018-03-23 VITALS
RESPIRATION RATE: 18 BRPM | DIASTOLIC BLOOD PRESSURE: 94 MMHG | OXYGEN SATURATION: 98 % | HEART RATE: 60 BPM | SYSTOLIC BLOOD PRESSURE: 118 MMHG

## 2018-03-23 VITALS
DIASTOLIC BLOOD PRESSURE: 89 MMHG | OXYGEN SATURATION: 100 % | HEART RATE: 100 BPM | RESPIRATION RATE: 16 BRPM | SYSTOLIC BLOOD PRESSURE: 148 MMHG | TEMPERATURE: 98 F

## 2018-03-23 VITALS
OXYGEN SATURATION: 99 % | DIASTOLIC BLOOD PRESSURE: 94 MMHG | TEMPERATURE: 98 F | SYSTOLIC BLOOD PRESSURE: 141 MMHG | RESPIRATION RATE: 18 BRPM | HEART RATE: 89 BPM

## 2018-03-23 DIAGNOSIS — M54.10 RADICULOPATHY, SITE UNSPECIFIED: ICD-10-CM

## 2018-03-23 DIAGNOSIS — Z90.49 ACQUIRED ABSENCE OF OTHER SPECIFIED PARTS OF DIGESTIVE TRACT: Chronic | ICD-10-CM

## 2018-03-23 LAB
ALBUMIN SERPL ELPH-MCNC: 4.2 G/DL — SIGNIFICANT CHANGE UP (ref 3.3–5)
ALP SERPL-CCNC: 51 U/L — SIGNIFICANT CHANGE UP (ref 40–120)
ALT FLD-CCNC: 18 U/L — SIGNIFICANT CHANGE UP (ref 4–33)
ANISOCYTOSIS BLD QL: SLIGHT — SIGNIFICANT CHANGE UP
AST SERPL-CCNC: 19 U/L — SIGNIFICANT CHANGE UP (ref 4–32)
BASOPHILS # BLD AUTO: 0.05 K/UL — SIGNIFICANT CHANGE UP (ref 0–0.2)
BASOPHILS NFR BLD AUTO: 0.4 % — SIGNIFICANT CHANGE UP (ref 0–2)
BASOPHILS NFR SPEC: 1.8 % — SIGNIFICANT CHANGE UP (ref 0–2)
BILIRUB SERPL-MCNC: 0.3 MG/DL — SIGNIFICANT CHANGE UP (ref 0.2–1.2)
BUN SERPL-MCNC: 17 MG/DL — SIGNIFICANT CHANGE UP (ref 7–23)
CALCIUM SERPL-MCNC: 9.5 MG/DL — SIGNIFICANT CHANGE UP (ref 8.4–10.5)
CHLORIDE SERPL-SCNC: 100 MMOL/L — SIGNIFICANT CHANGE UP (ref 98–107)
CO2 SERPL-SCNC: 24 MMOL/L — SIGNIFICANT CHANGE UP (ref 22–31)
CREAT SERPL-MCNC: 0.45 MG/DL — LOW (ref 0.5–1.3)
EOSINOPHIL # BLD AUTO: 0.08 K/UL — SIGNIFICANT CHANGE UP (ref 0–0.5)
EOSINOPHIL NFR BLD AUTO: 0.6 % — SIGNIFICANT CHANGE UP (ref 0–6)
EOSINOPHIL NFR FLD: 0 % — SIGNIFICANT CHANGE UP (ref 0–6)
GLUCOSE SERPL-MCNC: 159 MG/DL — HIGH (ref 70–99)
HCG SERPL-ACNC: < 5 MIU/ML — SIGNIFICANT CHANGE UP
HCT VFR BLD CALC: 43.1 % — SIGNIFICANT CHANGE UP (ref 34.5–45)
HGB BLD-MCNC: 14.3 G/DL — SIGNIFICANT CHANGE UP (ref 11.5–15.5)
IMM GRANULOCYTES # BLD AUTO: 0.04 # — SIGNIFICANT CHANGE UP
IMM GRANULOCYTES NFR BLD AUTO: 0.3 % — SIGNIFICANT CHANGE UP (ref 0–1.5)
LYMPHOCYTES # BLD AUTO: 41.5 % — SIGNIFICANT CHANGE UP (ref 13–44)
LYMPHOCYTES # BLD AUTO: 5.38 K/UL — HIGH (ref 1–3.3)
LYMPHOCYTES NFR SPEC AUTO: 25.4 % — SIGNIFICANT CHANGE UP (ref 13–44)
MCHC RBC-ENTMCNC: 29.9 PG — SIGNIFICANT CHANGE UP (ref 27–34)
MCHC RBC-ENTMCNC: 33.2 % — SIGNIFICANT CHANGE UP (ref 32–36)
MCV RBC AUTO: 90 FL — SIGNIFICANT CHANGE UP (ref 80–100)
MONOCYTES # BLD AUTO: 0.95 K/UL — HIGH (ref 0–0.9)
MONOCYTES NFR BLD AUTO: 7.3 % — SIGNIFICANT CHANGE UP (ref 2–14)
MONOCYTES NFR BLD: 4.4 % — SIGNIFICANT CHANGE UP (ref 2–9)
NEUTROPHIL AB SER-ACNC: 50 % — SIGNIFICANT CHANGE UP (ref 43–77)
NEUTROPHILS # BLD AUTO: 6.47 K/UL — SIGNIFICANT CHANGE UP (ref 1.8–7.4)
NEUTROPHILS NFR BLD AUTO: 49.9 % — SIGNIFICANT CHANGE UP (ref 43–77)
NEUTS BAND # BLD: 0.9 % — SIGNIFICANT CHANGE UP (ref 0–6)
NRBC # FLD: 0 — SIGNIFICANT CHANGE UP
PLATELET # BLD AUTO: 308 K/UL — SIGNIFICANT CHANGE UP (ref 150–400)
PLATELET COUNT - ESTIMATE: NORMAL — SIGNIFICANT CHANGE UP
PMV BLD: 8.9 FL — SIGNIFICANT CHANGE UP (ref 7–13)
POTASSIUM SERPL-MCNC: 4.4 MMOL/L — SIGNIFICANT CHANGE UP (ref 3.5–5.3)
POTASSIUM SERPL-SCNC: 4.4 MMOL/L — SIGNIFICANT CHANGE UP (ref 3.5–5.3)
PROT SERPL-MCNC: 8 G/DL — SIGNIFICANT CHANGE UP (ref 6–8.3)
RBC # BLD: 4.79 M/UL — SIGNIFICANT CHANGE UP (ref 3.8–5.2)
RBC # FLD: 11.9 % — SIGNIFICANT CHANGE UP (ref 10.3–14.5)
SODIUM SERPL-SCNC: 141 MMOL/L — SIGNIFICANT CHANGE UP (ref 135–145)
VARIANT LYMPHS # BLD: 17.5 % — SIGNIFICANT CHANGE UP
WBC # BLD: 12.97 K/UL — HIGH (ref 3.8–10.5)
WBC # FLD AUTO: 12.97 K/UL — HIGH (ref 3.8–10.5)

## 2018-03-23 PROCEDURE — 93010 ELECTROCARDIOGRAM REPORT: CPT

## 2018-03-23 PROCEDURE — 99223 1ST HOSP IP/OBS HIGH 75: CPT | Mod: 57

## 2018-03-23 PROCEDURE — 99285 EMERGENCY DEPT VISIT HI MDM: CPT

## 2018-03-23 PROCEDURE — 99285 EMERGENCY DEPT VISIT HI MDM: CPT | Mod: 25

## 2018-03-23 PROCEDURE — 72148 MRI LUMBAR SPINE W/O DYE: CPT | Mod: 26

## 2018-03-23 RX ORDER — KETOROLAC TROMETHAMINE 30 MG/ML
30 SYRINGE (ML) INJECTION ONCE
Qty: 0 | Refills: 0 | Status: DISCONTINUED | OUTPATIENT
Start: 2018-03-23 | End: 2018-03-23

## 2018-03-23 RX ORDER — OXYCODONE AND ACETAMINOPHEN 5; 325 MG/1; MG/1
1 TABLET ORAL ONCE
Qty: 0 | Refills: 0 | Status: DISCONTINUED | OUTPATIENT
Start: 2018-03-23 | End: 2018-03-23

## 2018-03-23 RX ORDER — MORPHINE SULFATE 50 MG/1
4 CAPSULE, EXTENDED RELEASE ORAL ONCE
Qty: 0 | Refills: 0 | Status: DISCONTINUED | OUTPATIENT
Start: 2018-03-23 | End: 2018-03-23

## 2018-03-23 RX ORDER — HYDROMORPHONE HYDROCHLORIDE 2 MG/ML
1 INJECTION INTRAMUSCULAR; INTRAVENOUS; SUBCUTANEOUS ONCE
Qty: 0 | Refills: 0 | Status: DISCONTINUED | OUTPATIENT
Start: 2018-03-23 | End: 2018-03-23

## 2018-03-23 RX ORDER — GABAPENTIN 400 MG/1
300 CAPSULE ORAL ONCE
Qty: 0 | Refills: 0 | Status: COMPLETED | OUTPATIENT
Start: 2018-03-23 | End: 2018-03-23

## 2018-03-23 RX ORDER — INSULIN LISPRO 100/ML
18 VIAL (ML) SUBCUTANEOUS ONCE
Qty: 0 | Refills: 0 | Status: COMPLETED | OUTPATIENT
Start: 2018-03-23 | End: 2018-03-23

## 2018-03-23 RX ORDER — DEXAMETHASONE 0.5 MG/5ML
10 ELIXIR ORAL ONCE
Qty: 0 | Refills: 0 | Status: COMPLETED | OUTPATIENT
Start: 2018-03-23 | End: 2018-03-23

## 2018-03-23 RX ORDER — ACETAMINOPHEN 500 MG
1000 TABLET ORAL ONCE
Qty: 0 | Refills: 0 | Status: COMPLETED | OUTPATIENT
Start: 2018-03-23 | End: 2018-03-23

## 2018-03-23 RX ADMIN — GABAPENTIN 300 MILLIGRAM(S): 400 CAPSULE ORAL at 22:50

## 2018-03-23 RX ADMIN — Medication 30 MILLIGRAM(S): at 15:20

## 2018-03-23 RX ADMIN — MORPHINE SULFATE 4 MILLIGRAM(S): 50 CAPSULE, EXTENDED RELEASE ORAL at 15:20

## 2018-03-23 RX ADMIN — Medication 400 MILLIGRAM(S): at 22:50

## 2018-03-23 RX ADMIN — HYDROMORPHONE HYDROCHLORIDE 1 MILLIGRAM(S): 2 INJECTION INTRAMUSCULAR; INTRAVENOUS; SUBCUTANEOUS at 22:49

## 2018-03-23 RX ADMIN — OXYCODONE AND ACETAMINOPHEN 1 TABLET(S): 5; 325 TABLET ORAL at 18:03

## 2018-03-23 RX ADMIN — OXYCODONE AND ACETAMINOPHEN 1 TABLET(S): 5; 325 TABLET ORAL at 19:03

## 2018-03-23 RX ADMIN — MORPHINE SULFATE 4 MILLIGRAM(S): 50 CAPSULE, EXTENDED RELEASE ORAL at 15:09

## 2018-03-23 RX ADMIN — Medication 18 UNIT(S): at 19:20

## 2018-03-23 RX ADMIN — Medication 30 MILLIGRAM(S): at 15:09

## 2018-03-23 NOTE — ED PROVIDER NOTE - PHYSICAL EXAMINATION
Александр: A & O x 3, in pain, HEENT WNL and no facial asymmetry; lungs CTAB, heart with reg rhythm; abdomen soft NTND; extremities with no edema; intact hip flexion strength bilaterally, skin with no rashes, neuro exam with paresthesia on right lateral lower extremity.
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
## 2018-03-23 NOTE — ED PROVIDER NOTE - PROGRESS NOTE DETAILS
PA Tiberio- pain 10/10, weakness and decreased sensation noted to RLE, escalated from QUID to main. pt started up, pain meds administered, basic labs drawn. Charge nurse Sheila Rae made aware. PA Tiberio- pain 10/10, crying, weakness and decreased sensation noted to RLE, escalated from QUID to main. pt started up, pain meds administered, basic labs drawn. Charge nurse Sheila Rae made aware. CELINE Terrell- Moved to room 11 and signed out to Dr. Avila. Dr. Mateus Castillo PGY1: received sign off on pt; amenable to surgery, has been discussing w/ ortho outpatient; d/w ortho will see patient Dr. Mateus Castillo PGY1: ortho recommending MRI; ordered that and percocet for pain Dr. Mateus Castillo PGY1: ortho attending requesting transfer to [facility] for MRI and preparation for spinal surgery, will transfer March 23, 2018  Approximately 20:30, ED Attending Luis E Avila MD,  33 y/o female signed out to me and to DR. Castillo with C/C of lower back pain with increased right leg weakness, numbness of right lower leg and diminished patella reflexes, transiently improved with three injections of Steroids, but now much worse after two weeks have elapsed since last steroid injection.  Orthopedics was called to see patient.  Patient now agrees to surgery. I called the Transfer Center and talked to DR. Mariscal at the [facility] ED and transferred the patient.  The patient was hemodynamically stable for transfer.

## 2018-03-23 NOTE — ED PROVIDER NOTE - MEDICAL DECISION MAKING DETAILS
31 y/o F with h/o diabetes lumbar sacral disc herniation presents with severe lower back pain radiating down RLE accompanied with numbness- basic labs, pain control, spine consult, possible Admit. 33 y/o F with h/o diabetes lumbar sacral disc herniation presents with severe lower back pain radiating down RLE accompanied with weakness and numbness- basic labs, pain control, spine consult, possible Admit, Escalated to the main from [facility].
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
## 2018-03-23 NOTE — ED PROVIDER NOTE - MEDICAL DECISION MAKING DETAILS
Александр PGY3: acute on chronic back pain radiating down leg, difficulty walking, has been recommended surgery in the past with Mercedes. transferred from [facility] as patient now consents to surgery. will call ortho, basic labs, MRI and admit. Currently in ER not in urinary retention and intact strength, low suspicion for cord compression.

## 2018-03-23 NOTE — ED ADULT NURSE REASSESSMENT NOTE - NS ED NURSE REASSESS COMMENT FT1
pt transferred via stretcher by ems. pt a*o x3, nad noted. vss. tolerating pain, using hot packs with good relief. safety maintained

## 2018-03-23 NOTE — ED PROVIDER NOTE - OBJECTIVE STATEMENT
31 y/o F with h/o diabetes lumbar sacral disc herniation presents with severe lower back pain radiating down right side with numbness down the right leg.  Had MRI done here 1/20/18 showing "disc herniation at the L4-L5 level with displacement and impingement of the descending right L5 nerve roots". pt was admitted and treated with steroids. Surgery offered but declined. DC'ed on steroids. Followed up with Dr. Long outpt.  Continued steroid injection management x 3 with temporary relief but pain back with a rating of 10/10.  Pt noted to ambulate but with severe difficulty.  Pt takes at home Meloxicam, Tramadol (took 2 tday) along with 2 tylenol.  Also follows pain management Dr. Steele.  Denies narcotic and benzo use. 31 y/o F with h/o diabetes lumbar sacral disc herniation presents with severe lower back pain radiating down right side with numbness down the right leg.  Had MRI done here 1/20/18 showing "disc herniation at the L4-L5 level with displacement and impingement of the descending right L5 nerve roots". pt was admitted and treated with steroids. Surgery offered but declined. DC'ed on steroids. Followed up with Dr. Long outpt.  Continued steroid injection management x 3 with temporary relief but pain back with a rating of 10/10.  Pt noted to ambulate but with severe difficulty.  Pt takes at home Meloxicam, Tramadol (took 2 tday) along with 2 tylenol.  Also follows pain management Dr. Steele.  Denies narcotic and benzo use. Denies fever, chills, recurrent injury/trauma, bladder/bowel incontinence.
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
## 2018-03-24 ENCOUNTER — RESULT REVIEW (OUTPATIENT)
Age: 33
End: 2018-03-24

## 2018-03-24 ENCOUNTER — TRANSCRIPTION ENCOUNTER (OUTPATIENT)
Age: 33
End: 2018-03-24

## 2018-03-24 DIAGNOSIS — M54.16 RADICULOPATHY, LUMBAR REGION: ICD-10-CM

## 2018-03-24 LAB
ALBUMIN SERPL ELPH-MCNC: 3.8 G/DL — SIGNIFICANT CHANGE UP (ref 3.3–5)
ALP SERPL-CCNC: 46 U/L — SIGNIFICANT CHANGE UP (ref 40–120)
ALT FLD-CCNC: 17 U/L RC — SIGNIFICANT CHANGE UP (ref 10–45)
ANION GAP SERPL CALC-SCNC: 12 MMOL/L — SIGNIFICANT CHANGE UP (ref 5–17)
ANION GAP SERPL CALC-SCNC: 12 MMOL/L — SIGNIFICANT CHANGE UP (ref 5–17)
ANION GAP SERPL CALC-SCNC: 14 MMOL/L — SIGNIFICANT CHANGE UP (ref 5–17)
APPEARANCE UR: CLEAR — SIGNIFICANT CHANGE UP
APTT BLD: 29.5 SEC — SIGNIFICANT CHANGE UP (ref 27.5–37.4)
AST SERPL-CCNC: 14 U/L — SIGNIFICANT CHANGE UP (ref 10–40)
BASOPHILS # BLD AUTO: 0.01 K/UL — SIGNIFICANT CHANGE UP (ref 0–0.2)
BASOPHILS # BLD AUTO: 0.1 K/UL — SIGNIFICANT CHANGE UP (ref 0–0.2)
BASOPHILS NFR BLD AUTO: 0.1 % — SIGNIFICANT CHANGE UP (ref 0–2)
BILIRUB SERPL-MCNC: 0.5 MG/DL — SIGNIFICANT CHANGE UP (ref 0.2–1.2)
BILIRUB UR-MCNC: NEGATIVE — SIGNIFICANT CHANGE UP
BLD GP AB SCN SERPL QL: NEGATIVE — SIGNIFICANT CHANGE UP
BUN SERPL-MCNC: 16 MG/DL — SIGNIFICANT CHANGE UP (ref 7–23)
BUN SERPL-MCNC: 17 MG/DL — SIGNIFICANT CHANGE UP (ref 7–23)
BUN SERPL-MCNC: 18 MG/DL — SIGNIFICANT CHANGE UP (ref 7–23)
CALCIUM SERPL-MCNC: 9.1 MG/DL — SIGNIFICANT CHANGE UP (ref 8.4–10.5)
CALCIUM SERPL-MCNC: 9.2 MG/DL — SIGNIFICANT CHANGE UP (ref 8.4–10.5)
CALCIUM SERPL-MCNC: 9.3 MG/DL — SIGNIFICANT CHANGE UP (ref 8.4–10.5)
CHLORIDE SERPL-SCNC: 96 MMOL/L — SIGNIFICANT CHANGE UP (ref 96–108)
CHLORIDE SERPL-SCNC: 96 MMOL/L — SIGNIFICANT CHANGE UP (ref 96–108)
CHLORIDE SERPL-SCNC: 98 MMOL/L — SIGNIFICANT CHANGE UP (ref 96–108)
CO2 SERPL-SCNC: 22 MMOL/L — SIGNIFICANT CHANGE UP (ref 22–31)
CO2 SERPL-SCNC: 24 MMOL/L — SIGNIFICANT CHANGE UP (ref 22–31)
CO2 SERPL-SCNC: 26 MMOL/L — SIGNIFICANT CHANGE UP (ref 22–31)
COLOR SPEC: YELLOW — SIGNIFICANT CHANGE UP
CREAT SERPL-MCNC: 0.43 MG/DL — LOW (ref 0.5–1.3)
CREAT SERPL-MCNC: 0.45 MG/DL — LOW (ref 0.5–1.3)
CREAT SERPL-MCNC: 0.49 MG/DL — LOW (ref 0.5–1.3)
DIFF PNL FLD: ABNORMAL
EOSINOPHIL # BLD AUTO: 0 K/UL — SIGNIFICANT CHANGE UP (ref 0–0.5)
EOSINOPHIL # BLD AUTO: 0.1 K/UL — SIGNIFICANT CHANGE UP (ref 0–0.5)
EOSINOPHIL NFR BLD AUTO: 0 % — SIGNIFICANT CHANGE UP (ref 0–6)
EOSINOPHIL NFR BLD AUTO: 1 % — SIGNIFICANT CHANGE UP (ref 0–6)
GLUCOSE BLDC GLUCOMTR-MCNC: 201 MG/DL — HIGH (ref 70–99)
GLUCOSE BLDC GLUCOMTR-MCNC: 209 MG/DL — HIGH (ref 70–99)
GLUCOSE BLDC GLUCOMTR-MCNC: 237 MG/DL — HIGH (ref 70–99)
GLUCOSE BLDC GLUCOMTR-MCNC: 311 MG/DL — HIGH (ref 70–99)
GLUCOSE SERPL-MCNC: 126 MG/DL — HIGH (ref 70–99)
GLUCOSE SERPL-MCNC: 360 MG/DL — HIGH (ref 70–99)
GLUCOSE SERPL-MCNC: 382 MG/DL — HIGH (ref 70–99)
GLUCOSE UR QL: >1000 MG/DL
HCG SERPL QL: NEGATIVE — SIGNIFICANT CHANGE UP
HCT VFR BLD CALC: 38.7 % — SIGNIFICANT CHANGE UP (ref 34.5–45)
HCT VFR BLD CALC: 39 % — SIGNIFICANT CHANGE UP (ref 34.5–45)
HGB BLD-MCNC: 13.5 G/DL — SIGNIFICANT CHANGE UP (ref 11.5–15.5)
HGB BLD-MCNC: 13.5 G/DL — SIGNIFICANT CHANGE UP (ref 11.5–15.5)
IMM GRANULOCYTES NFR BLD AUTO: 0.1 % — SIGNIFICANT CHANGE UP (ref 0–1.5)
INR BLD: 0.98 RATIO — SIGNIFICANT CHANGE UP (ref 0.88–1.16)
INR BLD: 0.98 RATIO — SIGNIFICANT CHANGE UP (ref 0.88–1.16)
KETONES UR-MCNC: ABNORMAL
LEUKOCYTE ESTERASE UR-ACNC: ABNORMAL
LYMPHOCYTES # BLD AUTO: 1.19 K/UL — SIGNIFICANT CHANGE UP (ref 1–3.3)
LYMPHOCYTES # BLD AUTO: 10.1 % — LOW (ref 13–44)
LYMPHOCYTES # BLD AUTO: 21 % — SIGNIFICANT CHANGE UP (ref 13–44)
LYMPHOCYTES # BLD AUTO: 5.2 K/UL — HIGH (ref 1–3.3)
MCHC RBC-ENTMCNC: 31 PG — SIGNIFICANT CHANGE UP (ref 27–34)
MCHC RBC-ENTMCNC: 31.4 PG — SIGNIFICANT CHANGE UP (ref 27–34)
MCHC RBC-ENTMCNC: 34.5 GM/DL — SIGNIFICANT CHANGE UP (ref 32–36)
MCHC RBC-ENTMCNC: 34.9 GM/DL — SIGNIFICANT CHANGE UP (ref 32–36)
MCV RBC AUTO: 89 FL — SIGNIFICANT CHANGE UP (ref 80–100)
MCV RBC AUTO: 91.1 FL — SIGNIFICANT CHANGE UP (ref 80–100)
MONOCYTES # BLD AUTO: 0.11 K/UL — SIGNIFICANT CHANGE UP (ref 0–0.9)
MONOCYTES # BLD AUTO: 1.2 K/UL — HIGH (ref 0–0.9)
MONOCYTES NFR BLD AUTO: 0.9 % — LOW (ref 2–14)
MONOCYTES NFR BLD AUTO: 8 % — SIGNIFICANT CHANGE UP (ref 2–14)
NEUTROPHILS # BLD AUTO: 10.48 K/UL — HIGH (ref 1.8–7.4)
NEUTROPHILS # BLD AUTO: 6 K/UL — SIGNIFICANT CHANGE UP (ref 1.8–7.4)
NEUTROPHILS NFR BLD AUTO: 47 % — SIGNIFICANT CHANGE UP (ref 43–77)
NEUTROPHILS NFR BLD AUTO: 88.8 % — HIGH (ref 43–77)
NITRITE UR-MCNC: NEGATIVE — SIGNIFICANT CHANGE UP
PH UR: 6 — SIGNIFICANT CHANGE UP (ref 5–8)
PLAT MORPH BLD: NORMAL — SIGNIFICANT CHANGE UP
PLATELET # BLD AUTO: 286 K/UL — SIGNIFICANT CHANGE UP (ref 150–400)
PLATELET # BLD AUTO: 308 K/UL — SIGNIFICANT CHANGE UP (ref 150–400)
POTASSIUM SERPL-MCNC: 3.6 MMOL/L — SIGNIFICANT CHANGE UP (ref 3.5–5.3)
POTASSIUM SERPL-MCNC: 4.5 MMOL/L — SIGNIFICANT CHANGE UP (ref 3.5–5.3)
POTASSIUM SERPL-MCNC: 5.4 MMOL/L — HIGH (ref 3.5–5.3)
POTASSIUM SERPL-SCNC: 3.6 MMOL/L — SIGNIFICANT CHANGE UP (ref 3.5–5.3)
POTASSIUM SERPL-SCNC: 4.5 MMOL/L — SIGNIFICANT CHANGE UP (ref 3.5–5.3)
POTASSIUM SERPL-SCNC: 5.4 MMOL/L — HIGH (ref 3.5–5.3)
PROT SERPL-MCNC: 7.5 G/DL — SIGNIFICANT CHANGE UP (ref 6–8.3)
PROT UR-MCNC: NEGATIVE — SIGNIFICANT CHANGE UP
PROTHROM AB SERPL-ACNC: 10.6 SEC — SIGNIFICANT CHANGE UP (ref 9.8–12.7)
PROTHROM AB SERPL-ACNC: 11.1 SEC — SIGNIFICANT CHANGE UP (ref 10–13.1)
RBC # BLD: 4.29 M/UL — SIGNIFICANT CHANGE UP (ref 3.8–5.2)
RBC # BLD: 4.35 M/UL — SIGNIFICANT CHANGE UP (ref 3.8–5.2)
RBC # FLD: 11.2 % — SIGNIFICANT CHANGE UP (ref 10.3–14.5)
RBC # FLD: 12.6 % — SIGNIFICANT CHANGE UP (ref 10.3–14.5)
RBC BLD AUTO: NORMAL — SIGNIFICANT CHANGE UP
RH IG SCN BLD-IMP: POSITIVE — SIGNIFICANT CHANGE UP
RH IG SCN BLD-IMP: POSITIVE — SIGNIFICANT CHANGE UP
SODIUM SERPL-SCNC: 132 MMOL/L — LOW (ref 135–145)
SODIUM SERPL-SCNC: 132 MMOL/L — LOW (ref 135–145)
SODIUM SERPL-SCNC: 136 MMOL/L — SIGNIFICANT CHANGE UP (ref 135–145)
SP GR SPEC: 1.03 — HIGH (ref 1.01–1.02)
UROBILINOGEN FLD QL: NEGATIVE — SIGNIFICANT CHANGE UP
VARIANT LYMPHS # BLD: 23 % — HIGH (ref 0–6)
WBC # BLD: 11.8 K/UL — HIGH (ref 3.8–10.5)
WBC # BLD: 12.5 K/UL — HIGH (ref 3.8–10.5)
WBC # FLD AUTO: 11.8 K/UL — HIGH (ref 3.8–10.5)
WBC # FLD AUTO: 12.5 K/UL — HIGH (ref 3.8–10.5)

## 2018-03-24 PROCEDURE — 71045 X-RAY EXAM CHEST 1 VIEW: CPT | Mod: 26

## 2018-03-24 PROCEDURE — 72020 X-RAY EXAM OF SPINE 1 VIEW: CPT | Mod: 26

## 2018-03-24 PROCEDURE — 88304 TISSUE EXAM BY PATHOLOGIST: CPT | Mod: 26

## 2018-03-24 PROCEDURE — 88311 DECALCIFY TISSUE: CPT | Mod: 26

## 2018-03-24 PROCEDURE — 63047 LAM FACETEC & FORAMOT LUMBAR: CPT

## 2018-03-24 RX ORDER — ACETAMINOPHEN 500 MG
650 TABLET ORAL EVERY 6 HOURS
Qty: 0 | Refills: 0 | Status: DISCONTINUED | OUTPATIENT
Start: 2018-03-24 | End: 2018-03-25

## 2018-03-24 RX ORDER — INSULIN GLARGINE 100 [IU]/ML
28 INJECTION, SOLUTION SUBCUTANEOUS AT BEDTIME
Qty: 0 | Refills: 0 | Status: DISCONTINUED | OUTPATIENT
Start: 2018-03-24 | End: 2018-03-25

## 2018-03-24 RX ORDER — DEXTROSE 50 % IN WATER 50 %
12.5 SYRINGE (ML) INTRAVENOUS ONCE
Qty: 0 | Refills: 0 | Status: DISCONTINUED | OUTPATIENT
Start: 2018-03-24 | End: 2018-03-24

## 2018-03-24 RX ORDER — INSULIN GLARGINE 100 [IU]/ML
27 INJECTION, SOLUTION SUBCUTANEOUS AT BEDTIME
Qty: 0 | Refills: 0 | Status: DISCONTINUED | OUTPATIENT
Start: 2018-03-24 | End: 2018-03-24

## 2018-03-24 RX ORDER — HYDROMORPHONE HYDROCHLORIDE 2 MG/ML
1 INJECTION INTRAMUSCULAR; INTRAVENOUS; SUBCUTANEOUS ONCE
Qty: 0 | Refills: 0 | Status: DISCONTINUED | OUTPATIENT
Start: 2018-03-24 | End: 2018-03-24

## 2018-03-24 RX ORDER — HYDROMORPHONE HYDROCHLORIDE 2 MG/ML
0.5 INJECTION INTRAMUSCULAR; INTRAVENOUS; SUBCUTANEOUS EVERY 6 HOURS
Qty: 0 | Refills: 0 | Status: DISCONTINUED | OUTPATIENT
Start: 2018-03-24 | End: 2018-03-24

## 2018-03-24 RX ORDER — SODIUM CHLORIDE 9 MG/ML
1000 INJECTION, SOLUTION INTRAVENOUS
Qty: 0 | Refills: 0 | Status: DISCONTINUED | OUTPATIENT
Start: 2018-03-24 | End: 2018-03-24

## 2018-03-24 RX ORDER — CEFAZOLIN SODIUM 1 G
2000 VIAL (EA) INJECTION EVERY 8 HOURS
Qty: 0 | Refills: 0 | Status: COMPLETED | OUTPATIENT
Start: 2018-03-25 | End: 2018-03-25

## 2018-03-24 RX ORDER — MAGNESIUM HYDROXIDE 400 MG/1
30 TABLET, CHEWABLE ORAL EVERY 12 HOURS
Qty: 0 | Refills: 0 | Status: DISCONTINUED | OUTPATIENT
Start: 2018-03-24 | End: 2018-03-25

## 2018-03-24 RX ORDER — ACETAMINOPHEN 500 MG
650 TABLET ORAL EVERY 6 HOURS
Qty: 0 | Refills: 0 | Status: DISCONTINUED | OUTPATIENT
Start: 2018-03-24 | End: 2018-03-24

## 2018-03-24 RX ORDER — INSULIN LISPRO 100/ML
VIAL (ML) SUBCUTANEOUS
Qty: 0 | Refills: 0 | Status: DISCONTINUED | OUTPATIENT
Start: 2018-03-24 | End: 2018-03-25

## 2018-03-24 RX ORDER — FOLIC ACID 0.8 MG
1 TABLET ORAL DAILY
Qty: 0 | Refills: 0 | Status: DISCONTINUED | OUTPATIENT
Start: 2018-03-24 | End: 2018-03-24

## 2018-03-24 RX ORDER — DOCUSATE SODIUM 100 MG
100 CAPSULE ORAL THREE TIMES A DAY
Qty: 0 | Refills: 0 | Status: DISCONTINUED | OUTPATIENT
Start: 2018-03-24 | End: 2018-03-24

## 2018-03-24 RX ORDER — INSULIN LISPRO 100/ML
VIAL (ML) SUBCUTANEOUS EVERY 6 HOURS
Qty: 0 | Refills: 0 | Status: DISCONTINUED | OUTPATIENT
Start: 2018-03-24 | End: 2018-03-24

## 2018-03-24 RX ORDER — DIPHENHYDRAMINE HCL 50 MG
25 CAPSULE ORAL AT BEDTIME
Qty: 0 | Refills: 0 | Status: DISCONTINUED | OUTPATIENT
Start: 2018-03-24 | End: 2018-03-24

## 2018-03-24 RX ORDER — HYDROMORPHONE HYDROCHLORIDE 2 MG/ML
0.5 INJECTION INTRAMUSCULAR; INTRAVENOUS; SUBCUTANEOUS
Qty: 0 | Refills: 0 | Status: DISCONTINUED | OUTPATIENT
Start: 2018-03-24 | End: 2018-03-24

## 2018-03-24 RX ORDER — GABAPENTIN 400 MG/1
300 CAPSULE ORAL THREE TIMES A DAY
Qty: 0 | Refills: 0 | Status: DISCONTINUED | OUTPATIENT
Start: 2018-03-24 | End: 2018-03-24

## 2018-03-24 RX ORDER — OXYCODONE HYDROCHLORIDE 5 MG/1
10 TABLET ORAL EVERY 4 HOURS
Qty: 0 | Refills: 0 | Status: DISCONTINUED | OUTPATIENT
Start: 2018-03-24 | End: 2018-03-25

## 2018-03-24 RX ORDER — DEXAMETHASONE 0.5 MG/5ML
ELIXIR ORAL
Qty: 0 | Refills: 0 | Status: DISCONTINUED | OUTPATIENT
Start: 2018-03-24 | End: 2018-03-25

## 2018-03-24 RX ORDER — OXYCODONE HYDROCHLORIDE 5 MG/1
5 TABLET ORAL EVERY 4 HOURS
Qty: 0 | Refills: 0 | Status: DISCONTINUED | OUTPATIENT
Start: 2018-03-24 | End: 2018-03-25

## 2018-03-24 RX ORDER — DIPHENHYDRAMINE HCL 50 MG
25 CAPSULE ORAL AT BEDTIME
Qty: 0 | Refills: 0 | Status: DISCONTINUED | OUTPATIENT
Start: 2018-03-24 | End: 2018-03-25

## 2018-03-24 RX ORDER — DEXTROSE 50 % IN WATER 50 %
1 SYRINGE (ML) INTRAVENOUS ONCE
Qty: 0 | Refills: 0 | Status: DISCONTINUED | OUTPATIENT
Start: 2018-03-24 | End: 2018-03-24

## 2018-03-24 RX ORDER — SODIUM CHLORIDE 9 MG/ML
1000 INJECTION INTRAMUSCULAR; INTRAVENOUS; SUBCUTANEOUS
Qty: 0 | Refills: 0 | Status: DISCONTINUED | OUTPATIENT
Start: 2018-03-24 | End: 2018-03-24

## 2018-03-24 RX ORDER — OXYCODONE HYDROCHLORIDE 5 MG/1
5 TABLET ORAL EVERY 4 HOURS
Qty: 0 | Refills: 0 | Status: DISCONTINUED | OUTPATIENT
Start: 2018-03-24 | End: 2018-03-24

## 2018-03-24 RX ORDER — DEXTROSE 50 % IN WATER 50 %
25 SYRINGE (ML) INTRAVENOUS ONCE
Qty: 0 | Refills: 0 | Status: DISCONTINUED | OUTPATIENT
Start: 2018-03-24 | End: 2018-03-24

## 2018-03-24 RX ORDER — DOCUSATE SODIUM 100 MG
100 CAPSULE ORAL THREE TIMES A DAY
Qty: 0 | Refills: 0 | Status: DISCONTINUED | OUTPATIENT
Start: 2018-03-24 | End: 2018-03-25

## 2018-03-24 RX ORDER — SODIUM CHLORIDE 9 MG/ML
1000 INJECTION, SOLUTION INTRAVENOUS
Qty: 0 | Refills: 0 | Status: DISCONTINUED | OUTPATIENT
Start: 2018-03-24 | End: 2018-03-25

## 2018-03-24 RX ORDER — INSULIN GLARGINE 100 [IU]/ML
28 INJECTION, SOLUTION SUBCUTANEOUS AT BEDTIME
Qty: 0 | Refills: 0 | Status: DISCONTINUED | OUTPATIENT
Start: 2018-03-24 | End: 2018-03-24

## 2018-03-24 RX ORDER — GLUCAGON INJECTION, SOLUTION 0.5 MG/.1ML
1 INJECTION, SOLUTION SUBCUTANEOUS ONCE
Qty: 0 | Refills: 0 | Status: DISCONTINUED | OUTPATIENT
Start: 2018-03-24 | End: 2018-03-24

## 2018-03-24 RX ORDER — DEXAMETHASONE 0.5 MG/5ML
5 ELIXIR ORAL EVERY 6 HOURS
Qty: 0 | Refills: 0 | Status: DISCONTINUED | OUTPATIENT
Start: 2018-03-24 | End: 2018-03-25

## 2018-03-24 RX ORDER — FAMOTIDINE 10 MG/ML
20 INJECTION INTRAVENOUS EVERY 12 HOURS
Qty: 0 | Refills: 0 | Status: DISCONTINUED | OUTPATIENT
Start: 2018-03-24 | End: 2018-03-25

## 2018-03-24 RX ORDER — CYCLOBENZAPRINE HYDROCHLORIDE 10 MG/1
10 TABLET, FILM COATED ORAL THREE TIMES A DAY
Qty: 0 | Refills: 0 | Status: DISCONTINUED | OUTPATIENT
Start: 2018-03-24 | End: 2018-03-25

## 2018-03-24 RX ORDER — OXYCODONE HYDROCHLORIDE 5 MG/1
10 TABLET ORAL
Qty: 0 | Refills: 0 | Status: DISCONTINUED | OUTPATIENT
Start: 2018-03-24 | End: 2018-03-24

## 2018-03-24 RX ORDER — CYCLOBENZAPRINE HYDROCHLORIDE 10 MG/1
10 TABLET, FILM COATED ORAL THREE TIMES A DAY
Qty: 0 | Refills: 0 | Status: DISCONTINUED | OUTPATIENT
Start: 2018-03-24 | End: 2018-03-24

## 2018-03-24 RX ORDER — FOLIC ACID 0.8 MG
1 TABLET ORAL DAILY
Qty: 0 | Refills: 0 | Status: DISCONTINUED | OUTPATIENT
Start: 2018-03-24 | End: 2018-03-25

## 2018-03-24 RX ORDER — HYDROMORPHONE HYDROCHLORIDE 2 MG/ML
1 INJECTION INTRAMUSCULAR; INTRAVENOUS; SUBCUTANEOUS EVERY 4 HOURS
Qty: 0 | Refills: 0 | Status: DISCONTINUED | OUTPATIENT
Start: 2018-03-24 | End: 2018-03-25

## 2018-03-24 RX ORDER — OXYCODONE HYDROCHLORIDE 5 MG/1
5 TABLET ORAL
Qty: 0 | Refills: 0 | Status: DISCONTINUED | OUTPATIENT
Start: 2018-03-24 | End: 2018-03-24

## 2018-03-24 RX ORDER — ASCORBIC ACID 60 MG
500 TABLET,CHEWABLE ORAL
Qty: 0 | Refills: 0 | Status: DISCONTINUED | OUTPATIENT
Start: 2018-03-24 | End: 2018-03-25

## 2018-03-24 RX ORDER — PROCHLORPERAZINE MALEATE 5 MG
10 TABLET ORAL EVERY 8 HOURS
Qty: 0 | Refills: 0 | Status: DISCONTINUED | OUTPATIENT
Start: 2018-03-24 | End: 2018-03-25

## 2018-03-24 RX ORDER — SENNA PLUS 8.6 MG/1
2 TABLET ORAL AT BEDTIME
Qty: 0 | Refills: 0 | Status: DISCONTINUED | OUTPATIENT
Start: 2018-03-24 | End: 2018-03-25

## 2018-03-24 RX ORDER — ASCORBIC ACID 60 MG
500 TABLET,CHEWABLE ORAL
Qty: 0 | Refills: 0 | Status: DISCONTINUED | OUTPATIENT
Start: 2018-03-24 | End: 2018-03-24

## 2018-03-24 RX ORDER — HYDROMORPHONE HYDROCHLORIDE 2 MG/ML
1 INJECTION INTRAMUSCULAR; INTRAVENOUS; SUBCUTANEOUS
Qty: 0 | Refills: 0 | Status: DISCONTINUED | OUTPATIENT
Start: 2018-03-24 | End: 2018-03-24

## 2018-03-24 RX ORDER — GABAPENTIN 400 MG/1
300 CAPSULE ORAL THREE TIMES A DAY
Qty: 0 | Refills: 0 | Status: DISCONTINUED | OUTPATIENT
Start: 2018-03-24 | End: 2018-03-25

## 2018-03-24 RX ORDER — OXYCODONE HYDROCHLORIDE 5 MG/1
10 TABLET ORAL EVERY 4 HOURS
Qty: 0 | Refills: 0 | Status: DISCONTINUED | OUTPATIENT
Start: 2018-03-24 | End: 2018-03-24

## 2018-03-24 RX ADMIN — Medication 102 MILLIGRAM(S): at 00:17

## 2018-03-24 RX ADMIN — SODIUM CHLORIDE 100 MILLILITER(S): 9 INJECTION, SOLUTION INTRAVENOUS at 21:05

## 2018-03-24 RX ADMIN — HYDROMORPHONE HYDROCHLORIDE 0.5 MILLIGRAM(S): 2 INJECTION INTRAMUSCULAR; INTRAVENOUS; SUBCUTANEOUS at 21:07

## 2018-03-24 RX ADMIN — Medication 2: at 13:39

## 2018-03-24 RX ADMIN — Medication 20 MILLIGRAM(S): at 05:40

## 2018-03-24 RX ADMIN — OXYCODONE HYDROCHLORIDE 5 MILLIGRAM(S): 5 TABLET ORAL at 05:40

## 2018-03-24 RX ADMIN — SODIUM CHLORIDE 75 MILLILITER(S): 9 INJECTION INTRAMUSCULAR; INTRAVENOUS; SUBCUTANEOUS at 11:50

## 2018-03-24 RX ADMIN — HYDROMORPHONE HYDROCHLORIDE 0.5 MILLIGRAM(S): 2 INJECTION INTRAMUSCULAR; INTRAVENOUS; SUBCUTANEOUS at 21:00

## 2018-03-24 RX ADMIN — GABAPENTIN 300 MILLIGRAM(S): 400 CAPSULE ORAL at 05:40

## 2018-03-24 RX ADMIN — GABAPENTIN 300 MILLIGRAM(S): 400 CAPSULE ORAL at 13:08

## 2018-03-24 RX ADMIN — HYDROMORPHONE HYDROCHLORIDE 0.5 MILLIGRAM(S): 2 INJECTION INTRAMUSCULAR; INTRAVENOUS; SUBCUTANEOUS at 22:03

## 2018-03-24 RX ADMIN — OXYCODONE HYDROCHLORIDE 5 MILLIGRAM(S): 5 TABLET ORAL at 06:10

## 2018-03-24 RX ADMIN — OXYCODONE HYDROCHLORIDE 5 MILLIGRAM(S): 5 TABLET ORAL at 11:15

## 2018-03-24 RX ADMIN — Medication 4: at 21:25

## 2018-03-24 RX ADMIN — HYDROMORPHONE HYDROCHLORIDE 0.5 MILLIGRAM(S): 2 INJECTION INTRAMUSCULAR; INTRAVENOUS; SUBCUTANEOUS at 12:15

## 2018-03-24 RX ADMIN — HYDROMORPHONE HYDROCHLORIDE 0.5 MILLIGRAM(S): 2 INJECTION INTRAMUSCULAR; INTRAVENOUS; SUBCUTANEOUS at 11:46

## 2018-03-24 RX ADMIN — Medication: at 09:15

## 2018-03-24 RX ADMIN — OXYCODONE HYDROCHLORIDE 5 MILLIGRAM(S): 5 TABLET ORAL at 10:42

## 2018-03-24 RX ADMIN — HYDROMORPHONE HYDROCHLORIDE 0.5 MILLIGRAM(S): 2 INJECTION INTRAMUSCULAR; INTRAVENOUS; SUBCUTANEOUS at 21:48

## 2018-03-24 NOTE — CONSULT NOTE ADULT - ASSESSMENT
31 y/o female with history as listed presented for eval of LBP found to have at the L4-L5 level, a broad disc bulge with a right paracentral disc herniation, with focal right lateral recess narrowing. The paracentral disc herniation abuts the right L5 nerve root in the lateral recess. Mild central canal stenosis, with history or uncontrolled diabetes.    continue current care  post -operatively place on moderate dose sliding scale, if eating tonight restart home lantus dose. will adjust meds as needed  Given current information pt is acceptable risk for anesthesia  will follwo with you.

## 2018-03-24 NOTE — CONSULT NOTE ADULT - SUBJECTIVE AND OBJECTIVE BOX
MEDICINE - [phone]    From: Dr. Mercedes DIAZ 32y [id number]    Reason for Consultation: diabetes    Patient is a 32y old  Female who presents with a chief complaint of LBP w/radiculopathy (24 Mar 2018 00:22). Pt c/o back pain.      PAST MEDICAL & SURGICAL HISTORY:  Diabetes  S/P appendectomy    MEDICATIONS  (STANDING):  ascorbic acid 500 milliGRAM(s) Oral two times a day  dextrose 5%. 1000 milliLiter(s) (50 mL/Hr) IV Continuous <Continuous>  dextrose 50% Injectable 12.5 Gram(s) IV Push once  dextrose 50% Injectable 25 Gram(s) IV Push once  dextrose 50% Injectable 25 Gram(s) IV Push once  docusate sodium 100 milliGRAM(s) Oral three times a day  folic acid 1 milliGRAM(s) Oral daily  gabapentin 300 milliGRAM(s) Oral three times a day  insulin glargine Injectable (LANTUS) 28 Unit(s) SubCutaneous at bedtime  insulin lispro (HumaLOG) corrective regimen sliding scale   SubCutaneous every 6 hours  multivitamin 1 Tablet(s) Oral daily  sodium chloride 0.9%. 1000 milliLiter(s) (75 mL/Hr) IV Continuous <Continuous>    MEDICATIONS  (PRN):  acetaminophen   Tablet 650 milliGRAM(s) Oral every 6 hours PRN For Temp greater than 38 C (100.4 F)  acetaminophen   Tablet. 650 milliGRAM(s) Oral every 6 hours PRN HEAD ACHES  cyclobenzaprine 10 milliGRAM(s) Oral three times a day PRN Muscle Spasm  dextrose Gel 1 Dose(s) Oral once PRN Blood Glucose LESS THAN 70 milliGRAM(s)/deciliter  diphenhydrAMINE   Capsule 25 milliGRAM(s) Oral at bedtime PRN Insomnia  glucagon  Injectable 1 milliGRAM(s) IntraMuscular once PRN Glucose LESS THAN 70 milligrams/deciliter  HYDROmorphone  Injectable 1 milliGRAM(s) IV Push every 3 hours PRN Severe Pain (7 - 10)  oxyCODONE    IR 5 milliGRAM(s) Oral every 3 hours PRN Mild Pain (1 - 3)  oxyCODONE    IR 10 milliGRAM(s) Oral every 3 hours PRN Moderate Pain (4 - 6)    No Known Allergies    Soc hx- no tob  no etoh  no ivda    PHYSICAL EXAM:  Constitutional: c/o pain  HEENT: Normocephalic, EOMI  Neck:  No JVD  Respiratory: CTA B/L, No wheezes  Cardiovascular: S1, S2, RRR, + systolic murmur  Gastrointestinal: BS+, soft, NT/ND  Extremities: No peripheral edema  Neurological: AAOX3, no focal deficits  Psychiatric: Normal mood, normal affect  : No Campbell    Vital Signs Last 24 Hrs  T(C): 36.6 (24 Mar 2018 14:30), Max: 36.9 (23 Mar 2018 22:18)  T(F): 97.8 (24 Mar 2018 14:30), Max: 98.4 (23 Mar 2018 22:18)  HR: 76 (24 Mar 2018 14:30) (60 - 100)  BP: 117/70 (24 Mar 2018 14:30) (107/67 - 148/89)  BP(mean): --  RR: 16 (24 Mar 2018 14:30) (16 - 20)  SpO2: 99% (24 Mar 2018 14:30) (95% - 100%)  I&O's Summary    23 Mar 2018 07:01  -  24 Mar 2018 07:00  --------------------------------------------------------  IN: 450 mL / OUT: 0 mL / NET: 450 mL    24 Mar 2018 07:01  -  24 Mar 2018 15:45  --------------------------------------------------------  IN: 375 mL / OUT: 500 mL / NET: -125 mL        LABS:                        13.5   11.80 )-----------( 286      ( 24 Mar 2018 06:56 )             38.7     -    132<L>  |  96  |  17  ----------------------------<  360<H>  4.5   |  22  |  0.43<L>    Ca    9.1      24 Mar 2018 09:33    TPro  7.5  /  Alb  3.8  /  TBili  0.5  /  DBili  x   /  AST  14  /  ALT  17  /  AlkPhos  46          JACQUES DIAZ  Urinalysis Basic - ( 24 Mar 2018 08:01 )    Color: Yellow / Appearance: Clear / S.030 / pH: x  Gluc: x / Ketone: Small  / Bili: Negative / Urobili: Negative   Blood: x / Protein: Negative / Nitrite: Negative   Leuk Esterase: Small / RBC: 0-2 /HPF / WBC 10-25 /HPF   Sq Epi: x / Non Sq Epi: Occasional /HPF / Bacteria: x  RADIOLOGY & ADDITIONAL STUDIES:  < from: Xray Chest 1 View- PORTABLE-Urgent (18 @ 00:18) >  EXAM:  XR CHEST PORTABLE URGENT 1V                        PROCEDURE DATE:  2018    INTERPRETATION:    DATE OF STUDY: 3/24/18.  PRIOR: None.  CLINICAL INDICATION: 32-yo-female with lumbar radiculopathy - preop   chest.   TECHNIQUE: portable chest.  FINDINGS:   The cardiomediastinal silhouette is within normal limits.   The lungs are clear. No pleural effusion or pneumothorax.  The bony structures are intact.  IMPRESSION:   Clear lungs.  EDGAR HOUGH M.D., ATTENDING RADIOLOGIST  This document has been electronically signed. Mar 24 2018  9:58AM  < from: MR Lumbar Spine No Cont (18 @ 23:36) >  EXAM:  MR SPINE LUMBAR                        PROCEDURE DATE:  2018    INTERPRETATION:  History: Severe lumbar pain. Rule out spinal cord   compression. Back pain worsening for one day.  Description: A noncontrast MRI of the lumbar spine was performed.  Comparison is made to the lumbar spine MRI study from 2018.  Sagittal T1/T2/STIR, and axial T2/T1 weighted series were obtained.  At the L4-L5 level, a broad disc bulge with a right paracentral disc   herniation is again noted, with focal right lateral recess narrowing,   grossly stable compared to the 2018 MRI study. Right inferior   extrusion of the disc fragment is noted. The paracentral disc herniation   abuts the right L5 nerve root in the lateral recess. Mild central canal   stenosis is stable. Disc desiccation changes are again noted at this   level.  There is no lower thoracic spinal cord compression or cauda equina   compression. Minor degenerative changes involving the remainder the   lumbar spine are stable. Mild facet and ligamentum flavum hypertrophy are   noted.  There is no evidence for acute compression fracture or subluxation.  The urinary bladder appears distended, partially visualized. Correlate   for lack of recent micturition, bladder outlet obstruction, or neurogenic   bladder.  IMPRESSION:  At the L4-L5 level, a broad disc bulge with a right paracentral disc   herniation is again noted, with focal right lateral recess narrowing,   grossly stable compared to the 2018 MRI study. The paracentral disc   herniation abuts the right L5 nerve root in the lateral recess. Mild   central canal stenosis is stable.  JACOB KING M.D., ATTENDING RADIOLOGIST  This document has been electronically signed. Mar 24 2018 10:13AM     EKG - nsr at 72 bpm no acute st-t changes ( reviewed by me)  REVIEW OF SYSTEMS:  no ha or dizziness  no cp or palpitations  no sob or cough  no n/v/d/c  no dysuria or hematuria  no heat intol or cold intol  no rash or itchiness  no fevers/rigors/chills  + LBP 9/10  all other reviewed systems were negative.

## 2018-03-24 NOTE — H&P ADULT - HISTORY OF PRESENT ILLNESS
Patient is a 32y Female who presents with worsening LBP with radiation to her R leg. She admits to a h/o chronic LBP, for which she has been seeing Dr Long. The pain has been worsening over the past few days. She denies any new trauma or inciting event. Denies numbness/tingling/paresthesias/weakness. Denies bowel/bladder incontinence. Denies fevers/chills. No other complaints at this time.

## 2018-03-24 NOTE — H&P ADULT - ASSESSMENT
32F with LBP and radiculopathy  Admit to ortho  pain control  SCD  F/u Labs/preop testing  possible OR tomorrow  Will obtain med clearance  WBAT  IVF while npo  Will discuss with attending

## 2018-03-24 NOTE — PROGRESS NOTE ADULT - PROBLEM SELECTOR PLAN 1
- NPO  - IVF  - Bedrest with bathroom privileges w/ walker  - cont current tx    Gurpreet Mcfadden PA-C  Orthopedic Surgery  Pager: [phone]  Spectra: [phone]

## 2018-03-24 NOTE — H&P ADULT - NSHPPHYSICALEXAM_GEN_ALL_CORE
Vital Signs Last 24 Hrs  T(C): 36.9 (03-23-18 @ 22:18), Max: 36.9 (03-23-18 @ 22:18)  T(F): 98.4 (03-23-18 @ 22:18), Max: 98.4 (03-23-18 @ 22:18)  HR: 99 (03-23-18 @ 22:18) (60 - 100)  BP: 119/65 (03-23-18 @ 22:18) (118/94 - 148/89)  BP(mean): --  RR: 20 (03-23-18 @ 22:18) (16 - 20)  SpO2: 100% (03-23-18 @ 22:18) (98% - 100%)    Gen: NAD    Spine PE:  Skin intact  No gross deformity  No midnline TTP C/T/L/S spine  No bony step offs  lumbar ttp/hypertonicity   Negative clonus  Negative babinski  Negative german  + rectal tone  No saddle anesthesia    Motor:                   C5                C6              C7               C8           T1   R            5/5                5/5            5/5             5/5          5/5  L            5/5                5/5            5/5             5/5          5/5                L2             L3             L4               L5            S1  R         5/5                5/5            5/5             5/5          5/5  L          5/5                5/5            5/5             5/5          5/5    SILT L3-S1 bilaterally LE  Compartments soft  limbs warm/well perfused

## 2018-03-25 ENCOUNTER — TRANSCRIPTION ENCOUNTER (OUTPATIENT)
Age: 33
End: 2018-03-25

## 2018-03-25 VITALS
HEART RATE: 92 BPM | DIASTOLIC BLOOD PRESSURE: 56 MMHG | RESPIRATION RATE: 17 BRPM | SYSTOLIC BLOOD PRESSURE: 104 MMHG | TEMPERATURE: 98 F | OXYGEN SATURATION: 95 %

## 2018-03-25 DIAGNOSIS — E11.65 TYPE 2 DIABETES MELLITUS WITH HYPERGLYCEMIA: ICD-10-CM

## 2018-03-25 LAB
ANION GAP SERPL CALC-SCNC: 13 MMOL/L — SIGNIFICANT CHANGE UP (ref 5–17)
BASOPHILS # BLD AUTO: 0 K/UL — SIGNIFICANT CHANGE UP (ref 0–0.2)
BASOPHILS NFR BLD AUTO: 0 % — SIGNIFICANT CHANGE UP (ref 0–2)
BUN SERPL-MCNC: 13 MG/DL — SIGNIFICANT CHANGE UP (ref 7–23)
CALCIUM SERPL-MCNC: 8.3 MG/DL — LOW (ref 8.4–10.5)
CHLORIDE SERPL-SCNC: 98 MMOL/L — SIGNIFICANT CHANGE UP (ref 96–108)
CO2 SERPL-SCNC: 24 MMOL/L — SIGNIFICANT CHANGE UP (ref 22–31)
CREAT SERPL-MCNC: 0.47 MG/DL — LOW (ref 0.5–1.3)
EOSINOPHIL # BLD AUTO: 0 K/UL — SIGNIFICANT CHANGE UP (ref 0–0.5)
EOSINOPHIL NFR BLD AUTO: 0 % — SIGNIFICANT CHANGE UP (ref 0–6)
GLUCOSE BLDC GLUCOMTR-MCNC: 172 MG/DL — HIGH (ref 70–99)
GLUCOSE BLDC GLUCOMTR-MCNC: 305 MG/DL — HIGH (ref 70–99)
GLUCOSE BLDC GLUCOMTR-MCNC: 403 MG/DL — HIGH (ref 70–99)
GLUCOSE BLDC GLUCOMTR-MCNC: 409 MG/DL — HIGH (ref 70–99)
GLUCOSE SERPL-MCNC: 350 MG/DL — HIGH (ref 70–99)
HCT VFR BLD CALC: 36.7 % — SIGNIFICANT CHANGE UP (ref 34.5–45)
HGB BLD-MCNC: 12 G/DL — SIGNIFICANT CHANGE UP (ref 11.5–15.5)
IMM GRANULOCYTES NFR BLD AUTO: 0.3 % — SIGNIFICANT CHANGE UP (ref 0–1.5)
LYMPHOCYTES # BLD AUTO: 1.56 K/UL — SIGNIFICANT CHANGE UP (ref 1–3.3)
LYMPHOCYTES # BLD AUTO: 13 % — SIGNIFICANT CHANGE UP (ref 13–44)
MCHC RBC-ENTMCNC: 30.1 PG — SIGNIFICANT CHANGE UP (ref 27–34)
MCHC RBC-ENTMCNC: 32.7 GM/DL — SIGNIFICANT CHANGE UP (ref 32–36)
MCV RBC AUTO: 92 FL — SIGNIFICANT CHANGE UP (ref 80–100)
MONOCYTES # BLD AUTO: 0.41 K/UL — SIGNIFICANT CHANGE UP (ref 0–0.9)
MONOCYTES NFR BLD AUTO: 3.4 % — SIGNIFICANT CHANGE UP (ref 2–14)
NEUTROPHILS # BLD AUTO: 10 K/UL — HIGH (ref 1.8–7.4)
NEUTROPHILS NFR BLD AUTO: 83.3 % — HIGH (ref 43–77)
PLATELET # BLD AUTO: 273 K/UL — SIGNIFICANT CHANGE UP (ref 150–400)
POTASSIUM SERPL-MCNC: 4.6 MMOL/L — SIGNIFICANT CHANGE UP (ref 3.5–5.3)
POTASSIUM SERPL-SCNC: 4.6 MMOL/L — SIGNIFICANT CHANGE UP (ref 3.5–5.3)
RBC # BLD: 3.99 M/UL — SIGNIFICANT CHANGE UP (ref 3.8–5.2)
RBC # FLD: 12.7 % — SIGNIFICANT CHANGE UP (ref 10.3–14.5)
SODIUM SERPL-SCNC: 135 MMOL/L — SIGNIFICANT CHANGE UP (ref 135–145)
WBC # BLD: 12 K/UL — HIGH (ref 3.8–10.5)
WBC # FLD AUTO: 12 K/UL — HIGH (ref 3.8–10.5)

## 2018-03-25 PROCEDURE — 93005 ELECTROCARDIOGRAM TRACING: CPT

## 2018-03-25 PROCEDURE — 85027 COMPLETE CBC AUTOMATED: CPT

## 2018-03-25 PROCEDURE — 96374 THER/PROPH/DIAG INJ IV PUSH: CPT

## 2018-03-25 PROCEDURE — 88304 TISSUE EXAM BY PATHOLOGIST: CPT

## 2018-03-25 PROCEDURE — 99222 1ST HOSP IP/OBS MODERATE 55: CPT

## 2018-03-25 PROCEDURE — 80053 COMPREHEN METABOLIC PANEL: CPT

## 2018-03-25 PROCEDURE — 86850 RBC ANTIBODY SCREEN: CPT

## 2018-03-25 PROCEDURE — 97161 PT EVAL LOW COMPLEX 20 MIN: CPT

## 2018-03-25 PROCEDURE — 96375 TX/PRO/DX INJ NEW DRUG ADDON: CPT

## 2018-03-25 PROCEDURE — 72020 X-RAY EXAM OF SPINE 1 VIEW: CPT

## 2018-03-25 PROCEDURE — 81001 URINALYSIS AUTO W/SCOPE: CPT

## 2018-03-25 PROCEDURE — 63047 LAM FACETEC & FORAMOT LUMBAR: CPT

## 2018-03-25 PROCEDURE — 84703 CHORIONIC GONADOTROPIN ASSAY: CPT

## 2018-03-25 PROCEDURE — 99223 1ST HOSP IP/OBS HIGH 75: CPT | Mod: 57

## 2018-03-25 PROCEDURE — 80048 BASIC METABOLIC PNL TOTAL CA: CPT

## 2018-03-25 PROCEDURE — 82962 GLUCOSE BLOOD TEST: CPT

## 2018-03-25 PROCEDURE — 85610 PROTHROMBIN TIME: CPT

## 2018-03-25 PROCEDURE — 99285 EMERGENCY DEPT VISIT HI MDM: CPT | Mod: 25

## 2018-03-25 PROCEDURE — 72148 MRI LUMBAR SPINE W/O DYE: CPT

## 2018-03-25 PROCEDURE — 86901 BLOOD TYPING SEROLOGIC RH(D): CPT

## 2018-03-25 PROCEDURE — 88311 DECALCIFY TISSUE: CPT

## 2018-03-25 PROCEDURE — C1889: CPT

## 2018-03-25 PROCEDURE — 86900 BLOOD TYPING SEROLOGIC ABO: CPT

## 2018-03-25 PROCEDURE — 71045 X-RAY EXAM CHEST 1 VIEW: CPT

## 2018-03-25 PROCEDURE — 85730 THROMBOPLASTIN TIME PARTIAL: CPT

## 2018-03-25 RX ORDER — INSULIN GLARGINE 100 [IU]/ML
28 INJECTION, SOLUTION SUBCUTANEOUS
Qty: 7 | Refills: 0 | OUTPATIENT
Start: 2018-03-25

## 2018-03-25 RX ORDER — ACETAMINOPHEN 500 MG
2 TABLET ORAL
Qty: 0 | Refills: 0 | COMMUNITY
Start: 2018-03-25

## 2018-03-25 RX ORDER — OXYCODONE HYDROCHLORIDE 5 MG/1
1 TABLET ORAL
Qty: 40 | Refills: 0 | OUTPATIENT
Start: 2018-03-25

## 2018-03-25 RX ORDER — METFORMIN HYDROCHLORIDE 850 MG/1
1 TABLET ORAL
Qty: 60 | Refills: 0 | OUTPATIENT
Start: 2018-03-25 | End: 2018-04-23

## 2018-03-25 RX ORDER — INSULIN LISPRO 100/ML
18 VIAL (ML) SUBCUTANEOUS
Qty: 0 | Refills: 0 | Status: DISCONTINUED | OUTPATIENT
Start: 2018-03-25 | End: 2018-03-25

## 2018-03-25 RX ADMIN — Medication 5 MILLIGRAM(S): at 05:25

## 2018-03-25 RX ADMIN — INSULIN GLARGINE 28 UNIT(S): 100 INJECTION, SOLUTION SUBCUTANEOUS at 00:27

## 2018-03-25 RX ADMIN — Medication 1 TABLET(S): at 11:52

## 2018-03-25 RX ADMIN — Medication 100 MILLIGRAM(S): at 00:28

## 2018-03-25 RX ADMIN — GABAPENTIN 300 MILLIGRAM(S): 400 CAPSULE ORAL at 00:27

## 2018-03-25 RX ADMIN — Medication 5 MILLIGRAM(S): at 00:27

## 2018-03-25 RX ADMIN — GABAPENTIN 300 MILLIGRAM(S): 400 CAPSULE ORAL at 13:46

## 2018-03-25 RX ADMIN — Medication 1 MILLIGRAM(S): at 11:52

## 2018-03-25 RX ADMIN — GABAPENTIN 300 MILLIGRAM(S): 400 CAPSULE ORAL at 05:25

## 2018-03-25 RX ADMIN — Medication 12: at 08:07

## 2018-03-25 RX ADMIN — Medication 650 MILLIGRAM(S): at 10:04

## 2018-03-25 RX ADMIN — Medication 650 MILLIGRAM(S): at 10:34

## 2018-03-25 RX ADMIN — Medication 100 MILLIGRAM(S): at 05:24

## 2018-03-25 RX ADMIN — Medication 500 MILLIGRAM(S): at 05:25

## 2018-03-25 RX ADMIN — Medication 8: at 12:11

## 2018-03-25 RX ADMIN — Medication 100 MILLIGRAM(S): at 10:03

## 2018-03-25 RX ADMIN — Medication 100 MILLIGRAM(S): at 02:55

## 2018-03-25 NOTE — DISCHARGE NOTE ADULT - PATIENT PORTAL LINK FT
You can access the [facility] Patient Portal, offered by [facility], by registering with the following website: [email or web address]

## 2018-03-25 NOTE — CONSULT NOTE ADULT - PROBLEM SELECTOR RECOMMENDATION 9
Diabetes Education and Nutrition Eval  Pt. to be discharged now with appointment tomorrow with Dr. Mitchell.   Discharge on home doses of Basaglar 28 units qhs and Humalog 18 units with meals and metformin 1000mg BID.  Consider Lulú placement in the office to further assess glycemic control and adjust regimen further.  Plan is to d/c off steroids, if any changes please let us know.   If she remains in patient, would increase Lantus to 30 and monitor on Humalog 18 with meals.   Goal glucose 100-180  Outpt. endo follow-up w/ Dr. Mitchell tomorrow.   Outpt. optho, podiatry, micro/cr at goal 2/2018  Needs education.

## 2018-03-25 NOTE — PROGRESS NOTE ADULT - ASSESSMENT
Impression: Stable       Plan:   Continue present treatment                 Out of bed, ambulate                  Physical therapy evaluation                  Continue to monitor        Antelmo Lockwood PA-C  Orthopaedic Surgery  Team pager [phone]  [phone]
Patient is a 32y old  Female who presents with a chief complaint of LBP w/ RLE radiculopathy (24 Mar 2018 00:22). OR today for L4-L5 Laminectomy/Discectomy.

## 2018-03-25 NOTE — DISCHARGE NOTE ADULT - MEDICATION SUMMARY - MEDICATIONS TO TAKE
I will START or STAY ON the medications listed below when I get home from the hospital:    acetaminophen 325 mg oral tablet  -- 2 tab(s) by mouth every 6 hours, As needed, For Temp greater than 38 C (100.4 F)  -- Indication: For Temps    acetaminophen 325 mg oral tablet  -- 2 tab(s) by mouth every 6 hours, As needed, Mild Pain (1 - 3)  -- Indication: For Pain    oxyCODONE 5 mg oral tablet  -- 1 or 2 tab(s) by mouth every 4 hours, As needed, for Pain MDD:8  -- Indication: For Pain    gabapentin 300 mg oral capsule  -- 1 cap(s) by mouth 3 times a day  -- Indication: For Nerve pain    HumaLOG KwikPen 100 units/mL subcutaneous solution  -- 18 unit(s) subcutaneous 3 times a day (before meals)  -- Do not drink alcoholic beverages when taking this medication.  It is very important that you take or use this exactly as directed.  Do not skip doses or discontinue unless directed by your doctor.  Keep in refrigerator.  Do not freeze.    -- Indication: For DIABETIC MEDICATION    Lantus Solostar Pen 100 units/mL subcutaneous solution  -- 28 unit(s) subcutaneous once a day (at bedtime)  -- Do not drink alcoholic beverages when taking this medication.  It is very important that you take or use this exactly as directed.  Do not skip doses or discontinue unless directed by your doctor.  Keep in refrigerator.  Do not freeze.    -- Indication: For DIABETIC MEDICATION    insulin lispro 100 units/mL subcutaneous solution  -- 2 unit(s) subcutaneous 3 times a day (before meals) ; 2 Unit(s) if Glucose 151 - 200  4 Unit(s) if Glucose 201 - 250  6 Unit(s) if Glucose 251 - 300  8 Unit(s) if Glucose 301 - 350  10 Unit(s) if Glucose 351 - 400  12 Unit(s) if Glucose GREATER THAN 400  only for 1/24 and 1/25   -- Indication: For DIABETIC MEDICATION    docusate sodium 100 mg oral capsule  -- 1 cap(s) by mouth 2 times a day  -- Indication: For Stool softener    cyclobenzaprine 10 mg oral tablet  -- 1 tab(s) by mouth 3 times a day, As needed, Muscle Spasm  -- Indication: For Muscle relaxants    pantoprazole 40 mg oral delayed release tablet  -- 1 tab(s) by mouth once a day (before a meal)  -- Indication: For Dyspepsia    Multiple Vitamins oral tablet  -- 1 tab(s) by mouth once a day  -- Indication: For Supplement I will START or STAY ON the medications listed below when I get home from the hospital:    acetaminophen 325 mg oral tablet  -- 2 tab(s) by mouth every 6 hours, As needed, For Temp greater than 38 C (100.4 F)  -- Indication: For Temps    acetaminophen 325 mg oral tablet  -- 2 tab(s) by mouth every 6 hours, As needed, Mild Pain (1 - 3)  -- Indication: For Pain    oxyCODONE 5 mg oral tablet  -- 1 or 2 tab(s) by mouth every 4 hours, As needed, for Pain MDD:8  -- Indication: For Pain medication    gabapentin 300 mg oral capsule  -- 1 cap(s) by mouth 3 times a day  -- Indication: For Nerve pain    insulin lispro 100 units/mL subcutaneous solution  -- 2 unit(s) subcutaneous 3 times a day (before meals) ; 2 Unit(s) if Glucose 151 - 200  4 Unit(s) if Glucose 201 - 250  6 Unit(s) if Glucose 251 - 300  8 Unit(s) if Glucose 301 - 350  10 Unit(s) if Glucose 351 - 400  12 Unit(s) if Glucose GREATER THAN 400  only for 1/24 and 1/25   -- Indication: For DIABETIC MEDICATION    HumaLOG KwikPen 100 units/mL subcutaneous solution  -- 18 unit(s) subcutaneous 3 times a day (before meals)  -- Do not drink alcoholic beverages when taking this medication.  It is very important that you take or use this exactly as directed.  Do not skip doses or discontinue unless directed by your doctor.  Keep in refrigerator.  Do not freeze.    -- Indication: For DIABETIC MEDICATION    metFORMIN 1000 mg oral tablet  -- 1 tab(s) by mouth 2 times a day MDD:1  -- Indication: For DIABETIC MEDICATION    Basaglar KwikPen 100 units/mL subcutaneous solution  -- 28 unit(s) subcutaneous once a day (at bedtime)  -- Indication: For DIABETIC MEDICATION    docusate sodium 100 mg oral capsule  -- 1 cap(s) by mouth 2 times a day  -- Indication: For Stool softener    cyclobenzaprine 10 mg oral tablet  -- 1 tab(s) by mouth 3 times a day, As needed, Muscle Spasm  -- Indication: For Muscle relaxants    pantoprazole 40 mg oral delayed release tablet  -- 1 tab(s) by mouth once a day (before a meal)  -- Indication: For Dyspepsia    Multiple Vitamins oral tablet  -- 1 tab(s) by mouth once a day  -- Indication: For Supplement I will START or STAY ON the medications listed below when I get home from the hospital:    acetaminophen 325 mg oral tablet  -- 2 tab(s) by mouth every 6 hours, As needed, For Temp greater than 38 C (100.4 F)  -- Indication: For Temps    acetaminophen 325 mg oral tablet  -- 2 tab(s) by mouth every 6 hours, As needed, Mild Pain (1 - 3)  -- Indication: For Pain    oxyCODONE 5 mg oral tablet  -- 1 or 2 tab(s) by mouth every 4 hours, As needed, for Pain MDD:8  -- Indication: For Pain medication    gabapentin 300 mg oral capsule  -- 1 cap(s) by mouth 3 times a day  -- Indication: For Nerve pain    insulin lispro 100 units/mL subcutaneous solution  -- 2 unit(s) subcutaneous 3 times a day (before meals) ; 2 Unit(s) if Glucose 151 - 200  4 Unit(s) if Glucose 201 - 250  6 Unit(s) if Glucose 251 - 300  8 Unit(s) if Glucose 301 - 350  10 Unit(s) if Glucose 351 - 400  12 Unit(s) if Glucose GREATER THAN 400  only for 1/24 and 1/25   -- Indication: For DIABETIC MEDICATION    HumaLOG KwikPen 100 units/mL subcutaneous solution  -- 18 unit(s) subcutaneous 3 times a day (before meals)  -- Do not drink alcoholic beverages when taking this medication.  It is very important that you take or use this exactly as directed.  Do not skip doses or discontinue unless directed by your doctor.  Keep in refrigerator.  Do not freeze.    -- Indication: For DIABETIC MEDICATION    Basaglar KwikPen 100 units/mL subcutaneous solution  -- 28 unit(s) subcutaneous once a day (at bedtime)  -- Indication: For DIABETIC MEDICATION    metFORMIN 1000 mg oral tablet  -- 1 tab(s) by mouth 2 times a day MDD:2  -- Indication: For DIABETIC MEDICATION    docusate sodium 100 mg oral capsule  -- 1 cap(s) by mouth 2 times a day  -- Indication: For Stool softener    cyclobenzaprine 10 mg oral tablet  -- 1 tab(s) by mouth 3 times a day, As needed, Muscle Spasm  -- Indication: For Muscle relaxants    pantoprazole 40 mg oral delayed release tablet  -- 1 tab(s) by mouth once a day (before a meal)  -- Indication: For Dyspepsia    Multiple Vitamins oral tablet  -- 1 tab(s) by mouth once a day  -- Indication: For Supplement

## 2018-03-25 NOTE — CONSULT NOTE ADULT - SUBJECTIVE AND OBJECTIVE BOX
HPI:  33 y/o F w/ hx of Type 2 DM dx at age 18 in the [facility]. +neuropathy related to chronic back pain and herniated discs. No other reported microvascular complications. Last micro/cr 5 (2/2018). Was diet controlled until  2008 when she met with an endo in [place] who started her on janumet later added Prandin. In 2013 she was switched to Lantus and metformin which she took until 2014 and then decided to stop taking all her medications. She presented to [facility] 1/2018 with A1c of 11.7%. Discharged on Lantus 27 and Humalog 15 qac. Her doses were adjusted over the past few months especially with occasional steroid use for back pain. Her last doses were Basaglar 28 units qhs and Humalog 18 with meals. Metformin was added 2/2018. She ran out of medications a few weeks ago so has been off her basal insulin and metformin. When she was adherent with medications her glucose values at home ranged from 90's-180's occasionally >200 if nonadherent to diet. No reported hypoglycemia, but reports symptoms of hypoglycemia with values in the 80's and 90's. She tries to monitor carbs. Only diet soda. No juice. Denies polyuria, polydipsia, nausea or vomiting. No significant changes to weight. Mother and Father with Type 2 DM.   Presented with worsening LBP with radiation to her R leg. She admits to a h/o chronic LBP, for which she has been seeing Dr Long. Now s/p spinal surgery with hyperglycemia > 400 in the setting of not receiving pre-meal insulin (only sliding scale) and received steroids.       PAST MEDICAL & SURGICAL HISTORY:  Diabetes  S/P appendectomy      FAMILY HISTORY:  Mother and Father with Type 2 DM    Social History: No tobacco, occasional alcohol, no IVDA    Outpatient Medications:  Basaglar 28 units qhs  Humalog 18 units qac  Metformin 1000mg BID    MEDICATIONS  (STANDING):  ascorbic acid 500 milliGRAM(s) Oral two times a day  docusate sodium 100 milliGRAM(s) Oral three times a day  folic acid 1 milliGRAM(s) Oral daily  gabapentin 300 milliGRAM(s) Oral three times a day  insulin glargine Injectable (LANTUS) 28 Unit(s) SubCutaneous at bedtime  insulin lispro (HumaLOG) corrective regimen sliding scale   SubCutaneous three times a day before meals  insulin lispro Injectable (HumaLOG) 18 Unit(s) SubCutaneous three times a day before meals  lactated ringers. 1000 milliLiter(s) (100 mL/Hr) IV Continuous <Continuous>  multivitamin 1 Tablet(s) Oral daily  senna 2 Tablet(s) Oral at bedtime    MEDICATIONS  (PRN):  acetaminophen   Tablet 650 milliGRAM(s) Oral every 6 hours PRN For Temp greater than 38 C (100.4 F)  acetaminophen   Tablet. 650 milliGRAM(s) Oral every 6 hours PRN Mild Pain (1 - 3)  aluminum hydroxide/magnesium hydroxide/simethicone Suspension 30 milliLiter(s) Oral every 12 hours PRN Indigestion  cyclobenzaprine 10 milliGRAM(s) Oral three times a day PRN Muscle Spasm  diphenhydrAMINE   Capsule 25 milliGRAM(s) Oral at bedtime PRN Insomnia  famotidine    Tablet 20 milliGRAM(s) Oral every 12 hours PRN Dyspepsia  HYDROmorphone  Injectable 1 milliGRAM(s) IV Push every 4 hours PRN breakthrough pain  magnesium hydroxide Suspension 30 milliLiter(s) Oral every 12 hours PRN Constipation  oxyCODONE    IR 5 milliGRAM(s) Oral every 4 hours PRN Moderate Pain (4 - 6)  oxyCODONE    IR 10 milliGRAM(s) Oral every 4 hours PRN Severe Pain (7 - 10)  prochlorperazine   IVPB 10 milliGRAM(s) IV Intermittent every 8 hours PRN Nausea/vomiting      Allergies    No Known Allergies    Intolerances      Review of Systems:  Constitutional: No fever, No change in weight  Eyes: No blurry vision  Neuro: No headache, + paresthesias  HEENT: No throat pain  Cardiovascular: No chest pain  Respiratory: No SOB  GI: No nausea or vomiting  : No polyuria  Skin: no rash  Psych: no depression  Endocrine: No polydipsia, No heat or cold intolerance, rest as noted in HPI  Hem/lymph: no swelling    All other review of systems negative      PHYSICAL EXAM:  VITALS: T(C): 36.6 (03-25-18 @ 13:04)  T(F): 97.9 (03-25-18 @ 13:04), Max: 98.2 (03-24-18 @ 23:21)  HR: 92 (03-25-18 @ 13:04) (76 - 108)  BP: 104/56 (03-25-18 @ 13:04) (101/55 - 127/61)  RR:  (16 - 17)  SpO2:  (95% - 100%)  Wt(kg): --  GENERAL: NAD at this time  EYES: No proptosis, EOMI  HEENT:  Atraumatic, Normocephalic,   THYROID: Normal size, no palpable nodules  RESPIRATORY: Clear to auscultation bilaterally, full excursion, non-labored  CARDIOVASCULAR: Regular rhythm; No murmurs; no peripheral edema  GI: Soft, nontender, non distended, normal bowel sounds  SKIN: +Acanthosis, dressing on lower back intact, Dry,  No rashes or lesions  MUSCULOSKELETAL: normal strength  NEURO: follows commands, no tremor, normal reflexes  PSYCH: Alert and oriented x 3, normal affect, normal mood  CUSHING'S SIGNS: no striae      POCT Blood Glucose.: 305 mg/dL (03-25-18 @ 12:08)  POCT Blood Glucose.: 403 mg/dL (03-25-18 @ 08:13)  POCT Blood Glucose.: 409 mg/dL (03-25-18 @ 08:03)  POCT Blood Glucose.: 172 mg/dL (03-25-18 @ 00:24)  POCT Blood Glucose.: 209 mg/dL (03-24-18 @ 21:17)  POCT Blood Glucose.: 201 mg/dL (03-24-18 @ 17:48)  POCT Blood Glucose.: 237 mg/dL (03-24-18 @ 12:44)  POCT Blood Glucose.: 311 mg/dL (03-24-18 @ 09:13)  POCT Blood Glucose.: 118 mg/dL (03-23-18 @ 21:55)  POCT Blood Glucose.: 147 mg/dL (03-23-18 @ 19:10)  POCT Blood Glucose.: 95 mg/dL (03-23-18 @ 14:29)                              12.0   12.00 )-----------( 273      ( 25 Mar 2018 07:14 )             36.7       03-25    135  |  98  |  13  ----------------------------<  350<H>  4.6   |  24  |  0.47<L>    EGFR if : 154  EGFR if non : 133    Ca    8.3<L>      03-25    TPro  7.5  /  Alb  3.8  /  TBili  0.5  /  DBili  x   /  AST  14  /  ALT  17  /  AlkPhos  46  03-24    Thyroid Function Tests:      Hemoglobin A1C, Whole Blood: 11.7 % <H> [4.0 - 5.6] (01-20-18 @ 06:21)        Radiology:

## 2018-03-25 NOTE — PHYSICAL THERAPY INITIAL EVALUATION ADULT - ADDITIONAL COMMENTS
Pt lives with family in 2nd floor apartment, elevator not working at this time. Stairs (+) rail. Was independent ambulator without assistive device pta. Pt states has been ambulating on unit.

## 2018-03-25 NOTE — DISCHARGE NOTE ADULT - MEDICATION SUMMARY - MEDICATIONS TO CHANGE
I will SWITCH the dose or number of times a day I take the medications listed below when I get home from the hospital:    insulin lispro 100 units/mL subcutaneous solution  -- 2 unit(s) subcutaneous 3 times a day (before meals) ; 2 Unit(s) if Glucose 151 - 200  4 Unit(s) if Glucose 201 - 250  6 Unit(s) if Glucose 251 - 300  8 Unit(s) if Glucose 301 - 350  10 Unit(s) if Glucose 351 - 400  12 Unit(s) if Glucose GREATER THAN 400  only for 1/24 and 1/25

## 2018-03-25 NOTE — DISCHARGE NOTE ADULT - HOSPITAL COURSE
History of Present Illness:    Patient is a 32y Female who presents with worsening LBP with radiation to her R leg. She admits to a h/o chronic LBP, for which she has been seeing Dr Long. The pain has been worsening over the past few days. She denies any new trauma or inciting event. Denies numbness/tingling/paresthesias/weakness. Denies bowel/bladder incontinence. Denies fevers/chills. No other complaints at this time.         Review of Systems:  Other Review of Systems: All other review of systems negative, except as noted in HPI      Allergies and Intolerances:        Allergies:  	No Known Allergies:     Home Medications:   * Patient Currently Takes Medications as of 23-Jan-2018 16:16 documented in Structured Notes  · 	alcohol swabs: 1 unit(s) subcutaneous 4 times a day (before meals and at bedtime)   · 	4mm maryann needles : 1 unit(s) subcutaneous 4 times a day (before meals and at bedtime)   · 	test strip: 1 unit(s) subcutaneous 4 times a day (before meals and at bedtime)   · 	Glucometer: 1 unit(s) subcutaneous 4 times a day (before meals and at bedtime)   · 	lancets: 1 unit(s) subcutaneous 4 times a day (before meals and at bedtime)   · 	pantoprazole 40 mg oral delayed release tablet: 1 tab(s) orally once a day (before a meal)  · 	cyclobenzaprine 10 mg oral tablet: 1 tab(s) orally 3 times a day, As needed, Muscle Spasm  · 	docusate sodium 100 mg oral capsule: 1 cap(s) orally 2 times a day  · 	acetaminophen 325 mg oral tablet: 2 tab(s) orally every 8 hours, As Needed  · 	predniSONE 20 mg oral tablet: 1 tab(s) orally once a day  · 	morphine 15 mg oral tablet: 1 tab(s) orally every 8 hours, As needed, moderate to severe pain MDD:30mg  · 	gabapentin 300 mg oral capsule: 1 cap(s) orally 3 times a day  · 	insulin lispro 100 units/mL subcutaneous solution: 2 unit(s) subcutaneous 3 times a day (before meals) , 2 Unit(s) if Glucose 151 - 200  	4 Unit(s) if Glucose 201 - 250  	6 Unit(s) if Glucose 251 - 300  	8 Unit(s) if Glucose 301 - 350  	10 Unit(s) if Glucose 351 - 400  	12 Unit(s) if Glucose GREATER THAN 400  	only for 1/24 and 1/25   · 	HumaLOG KwikPen 100 units/mL subcutaneous solution: 15 unit(s) subcutaneous 3 times a day (before meals) 1/24, 1/25 while on prednisone  	on 1/26 decrease to 8units TID before meals   · 	Lantus Solostar Pen 100 units/mL subcutaneous solution: 27 unit(s) subcutaneous once a day (at bedtime)   	for 1/24 and 1/25 while on steroids   	decrease 24units once a day at bedtime  	on 1/26    Patient History:    Past Medical History:  Diabetes.     Past Surgical History:  S/P appendectomy.  3/23/18- Patient presents to the hospital ER with worsening Low back pain, known to surgeon to have herniated disc. Patient admitted and pre-oped for surgery.   3/24- Patient taken to the OR, underwent lumbar laminectomy/ discectomy, tolerated procedures without complication.  Patient was evaluated by PT. whom recommended home for discharge plan. Pt. blood glucose level elevated endocrine consult called, if cleared will d/c home.

## 2018-03-25 NOTE — CHART NOTE - NSCHARTNOTEFT_GEN_A_CORE
Orthopaedic Surgery Progress Note    Subjective:   Patient seen and examined  s/p L4/5 laminectomy/discectomy  Tolerated procedure well  Radicular pain in RLE resolved  Pain well controlled currently  Patient hopes to go home tomorrow    Objective:  T(C): 36.7 (18 @ 01:15), Max: 36.8 (18 @ 23:21)  HR: 106 (18 @ 01:15) (76 - 107)  BP: 108/60 (18 @ 01:15) (101/55 - 127/61)  RR: 16 (18 @ 01:15) (16 - 18)  SpO2: 98% (18 @ 01:15) (95% - 100%)     @ 07:01  -   @ 07:00  --------------------------------------------------------  IN: 450 mL / OUT: 0 mL / NET: 450 mL     @ 07:01  -   @ 01:36  --------------------------------------------------------  IN: 900 mL / OUT: 500 mL / NET: 400 mL    PE  NAD  Back:   dressing C/D/I, mild appropriate stacey-incisional ttp  Neuro:  RLE IP 5/5 HS 5/5 Q 5/5 GS 5/5 TA 5/5 EHL 5/5   SILT L2-S1  LLE IP 5/5 HS 5/5 Q 5/5 GS 5/5 TA 5/5 EHL 5/5  SILT L2-S1  WWP BLE                        13.5   11.80 )-----------( 286      ( 24 Mar 2018 06:56 )             38.7         132<L>  |  96  |  17  ----------------------------<  360<H>  4.5   |  22  |  0.43<L>    Ca    9.1      24 Mar 2018 09:33    TPro  7.5  /  Alb  3.8  /  TBili  0.5  /  DBili  x   /  AST  14  /  ALT  17  /  AlkPhos  46  03-24    PT/INR - ( 24 Mar 2018 06:56 )   PT: 11.1 sec;   INR: 0.98 ratio         PTT - ( 24 Mar 2018 00:04 )  PTT:29.5 sec  Urinalysis Basic - ( 24 Mar 2018 08:01 )    Color: Yellow / Appearance: Clear / S.030 / pH: x  Gluc: x / Ketone: Small  / Bili: Negative / Urobili: Negative   Blood: x / Protein: Negative / Nitrite: Negative   Leuk Esterase: Small / RBC: 0-2 /HPF / WBC 10-25 /HPF   Sq Epi: x / Non Sq Epi: Occasional /HPF / Bacteria: x    32y Female s/p L4/5 Lami/discectomy    - Pain control  - FU labs  - WBAT  - PT/OT/OOB  - I/S  - SCDs  - Dispo planning    Emory Luna MD

## 2018-03-25 NOTE — PROGRESS NOTE ADULT - SUBJECTIVE AND OBJECTIVE BOX
ORTHO  Patient is a 32y old  Female who presents with a chief complaint of LBP w/radiculopathy (24 Mar 2018 00:22)    Pt. resting without complaint    VS-  T(C): 36.8 (03-25-18 @ 05:05), Max: 36.8 (03-24-18 @ 23:21)  HR: 93 (03-25-18 @ 05:05) (76 - 107)  BP: 104/66 (03-25-18 @ 05:05) (101/55 - 127/61)  RR: 16 (03-25-18 @ 05:05) (16 - 16)  SpO2: 95% (03-25-18 @ 05:05) (95% - 100%)  Wt(kg): --    M.S.  A&O  Lower back- dressing C/D/I  Neuro-              Motor- (+)Ankle & EHL 5+/5              Sensation- grossly intact to light touch               Calves- soft, nontender- PAS                               13.5   11.80 )-----------( 286      ( 24 Mar 2018 06:56 )             38.7     03-25    135  |  98  |  13  ----------------------------<  350<H>  4.6   |  24  |  0.47<L>    Ca    8.3<L>      25 Mar 2018 06:15    TPro  7.5  /  Alb  3.8  /  TBili  0.5  /  DBili  x   /  AST  14  /  ALT  17  /  AlkPhos  46  03-24
Patient is a 32y old  Female who presents with a chief complaint of LBP w/ RLE radiculopathy (24 Mar 2018 00:22)    Patient comfortable  No complaints    T(C): 36.7 (03-24-18 @ 05:38), Max: 36.9 (03-23-18 @ 22:18)  HR: 84 (03-24-18 @ 05:38) (60 - 100)  BP: 107/67 (03-24-18 @ 05:38) (107/67 - 148/89)  RR: 18 (03-24-18 @ 05:38) (16 - 20)  SpO2: 98% (03-24-18 @ 05:38) (95% - 100%)  Wt(kg): --    PHYSICAL EXAM:  NAD, Alert  sensation grossly intact to light touch; (+) Distal Pulses; No Calf tenderness B/L, PAS                         Lower extremity                    PF          DF         EHL       FHL                                                                                            R        5/5        5/5        5/5       5/5                                                        L         5/5        5/5        5/5       5/5      LABS:                        13.5   12.5  )-----------( 308      ( 24 Mar 2018 00:04 )             39.0     03-24    132<L>  |  96  |  18  ----------------------------<  382<H>  5.4<H>   |  24  |  0.49<L>    Ca    9.2      24 Mar 2018 05:46    TPro  7.5  /  Alb  3.8  /  TBili  0.5  /  DBili  x   /  AST  14  /  ALT  17  /  AlkPhos  46  03-24    PT/INR - ( 24 Mar 2018 00:04 )   PT: 10.6 sec;   INR: 0.98 ratio      PTT - ( 24 Mar 2018 00:04 )  PTT:29.5 sec

## 2018-03-25 NOTE — DISCHARGE NOTE ADULT - CARE PLAN
Principal Discharge DX:	Lumbar radiculopathy  Goal:	Pain relief, improvement with activities of daily living  Assessment and plan of treatment:	Call for a follow up appointment, follow instruction sheet

## 2018-03-25 NOTE — DISCHARGE NOTE ADULT - CARE PROVIDER_API CALL
Kunal Long (MD), Orthopedics  [address] [address]  Phone: [phone]  Fax: [phone]

## 2018-03-25 NOTE — DISCHARGE NOTE ADULT - PLAN OF CARE
Pain relief, improvement with activities of daily living Call for a follow up appointment, follow instruction sheet

## 2018-03-25 NOTE — CONSULT NOTE ADULT - ASSESSMENT
31 y/o F w/ uncontrolled Type 2 DM w/ A1c of 11.7% admitted with worsening low back pain s/p spinal surgery.

## 2018-03-26 ENCOUNTER — APPOINTMENT (OUTPATIENT)
Dept: ENDOCRINOLOGY | Facility: CLINIC | Age: 33
End: 2018-03-26

## 2018-03-27 ENCOUNTER — RX RENEWAL (OUTPATIENT)
Age: 33
End: 2018-03-27

## 2018-03-27 LAB — SURGICAL PATHOLOGY STUDY: SIGNIFICANT CHANGE UP

## 2018-03-27 RX ORDER — GABAPENTIN 300 MG/1
300 CAPSULE ORAL
Qty: 90 | Refills: 1 | Status: ACTIVE | COMMUNITY
Start: 2018-03-27 | End: 1900-01-01

## 2018-03-27 RX ORDER — HYDROMORPHONE HYDROCHLORIDE 2 MG/1
2 TABLET ORAL
Qty: 40 | Refills: 0 | Status: ACTIVE | COMMUNITY
Start: 2018-03-27 | End: 1900-01-01

## 2018-04-05 ENCOUNTER — APPOINTMENT (OUTPATIENT)
Dept: ORTHOPEDIC SURGERY | Facility: CLINIC | Age: 33
End: 2018-04-05
Payer: MEDICAID

## 2018-04-05 PROCEDURE — 99024 POSTOP FOLLOW-UP VISIT: CPT

## 2018-04-13 ENCOUNTER — TRANSCRIPTION ENCOUNTER (OUTPATIENT)
Age: 33
End: 2018-04-13

## 2018-04-19 ENCOUNTER — RX RENEWAL (OUTPATIENT)
Age: 33
End: 2018-04-19

## 2018-04-23 ENCOUNTER — RX RENEWAL (OUTPATIENT)
Age: 33
End: 2018-04-23

## 2018-05-14 ENCOUNTER — RX RENEWAL (OUTPATIENT)
Age: 33
End: 2018-05-14

## 2018-05-17 ENCOUNTER — APPOINTMENT (OUTPATIENT)
Dept: ORTHOPEDIC SURGERY | Facility: CLINIC | Age: 33
End: 2018-05-17
Payer: MEDICAID

## 2018-05-17 PROCEDURE — 99024 POSTOP FOLLOW-UP VISIT: CPT

## 2018-05-17 RX ORDER — MELOXICAM 15 MG/1
15 TABLET ORAL
Qty: 30 | Refills: 3 | Status: ACTIVE | COMMUNITY
Start: 2018-05-17 | End: 1900-01-01

## 2018-06-01 PROCEDURE — G9005: CPT

## 2018-06-12 ENCOUNTER — APPOINTMENT (OUTPATIENT)
Dept: ORTHOPEDIC SURGERY | Facility: CLINIC | Age: 33
End: 2018-06-12

## 2018-06-25 ENCOUNTER — RX RENEWAL (OUTPATIENT)
Age: 33
End: 2018-06-25

## 2018-06-26 ENCOUNTER — CLINICAL ADVICE (OUTPATIENT)
Age: 33
End: 2018-06-26

## 2018-07-01 ENCOUNTER — OUTPATIENT (OUTPATIENT)
Dept: OUTPATIENT SERVICES | Facility: HOSPITAL | Age: 33
LOS: 1 days | End: 2018-07-01
Payer: MEDICAID

## 2018-07-01 DIAGNOSIS — Z90.49 ACQUIRED ABSENCE OF OTHER SPECIFIED PARTS OF DIGESTIVE TRACT: Chronic | ICD-10-CM

## 2018-07-01 PROCEDURE — G9005: CPT

## 2018-07-10 ENCOUNTER — APPOINTMENT (OUTPATIENT)
Dept: ORTHOPEDIC SURGERY | Facility: CLINIC | Age: 33
End: 2018-07-10
Payer: MEDICAID

## 2018-07-10 DIAGNOSIS — M51.26 OTHER INTERVERTEBRAL DISC DISPLACEMENT, LUMBAR REGION: ICD-10-CM

## 2018-07-10 PROCEDURE — 99213 OFFICE O/P EST LOW 20 MIN: CPT

## 2018-07-10 RX ORDER — GABAPENTIN 300 MG/1
300 CAPSULE ORAL
Qty: 90 | Refills: 1 | Status: ACTIVE | COMMUNITY
Start: 2018-07-10 | End: 1900-01-01

## 2018-07-10 RX ORDER — MELOXICAM 15 MG/1
15 TABLET ORAL
Qty: 30 | Refills: 1 | Status: ACTIVE | COMMUNITY
Start: 2018-07-10 | End: 1900-01-01

## 2018-07-13 DIAGNOSIS — Z71.89 OTHER SPECIFIED COUNSELING: ICD-10-CM

## 2018-07-26 NOTE — ED PROVIDER NOTE - NS ED ATTENDING STATEMENT MOD
abdominal pain
I have personally seen and examined this patient.  I have fully participated in the care of this patient. I have reviewed all pertinent clinical information, including history, physical exam, plan and the Resident’s note and agree except as noted.

## 2018-08-23 ENCOUNTER — APPOINTMENT (OUTPATIENT)
Dept: ORTHOPEDIC SURGERY | Facility: CLINIC | Age: 33
End: 2018-08-23
Payer: SELF-PAY

## 2018-08-23 VITALS — HEART RATE: 90 BPM | DIASTOLIC BLOOD PRESSURE: 80 MMHG | SYSTOLIC BLOOD PRESSURE: 123 MMHG

## 2018-08-23 PROCEDURE — 99214 OFFICE O/P EST MOD 30 MIN: CPT

## 2018-09-16 ENCOUNTER — FORM ENCOUNTER (OUTPATIENT)
Age: 33
End: 2018-09-16

## 2018-09-17 ENCOUNTER — OUTPATIENT (OUTPATIENT)
Dept: OUTPATIENT SERVICES | Facility: HOSPITAL | Age: 33
LOS: 1 days | End: 2018-09-17
Payer: COMMERCIAL

## 2018-09-17 ENCOUNTER — APPOINTMENT (OUTPATIENT)
Dept: MRI IMAGING | Facility: IMAGING CENTER | Age: 33
End: 2018-09-17
Payer: COMMERCIAL

## 2018-09-17 DIAGNOSIS — M54.16 RADICULOPATHY, LUMBAR REGION: ICD-10-CM

## 2018-09-17 DIAGNOSIS — Z90.49 ACQUIRED ABSENCE OF OTHER SPECIFIED PARTS OF DIGESTIVE TRACT: Chronic | ICD-10-CM

## 2018-09-17 PROCEDURE — A9585: CPT

## 2018-09-17 PROCEDURE — 72158 MRI LUMBAR SPINE W/O & W/DYE: CPT

## 2018-09-17 PROCEDURE — 82565 ASSAY OF CREATININE: CPT

## 2018-09-17 PROCEDURE — 72158 MRI LUMBAR SPINE W/O & W/DYE: CPT | Mod: 26

## 2018-09-18 PROBLEM — E11.9 TYPE 2 DIABETES MELLITUS WITHOUT COMPLICATIONS: Chronic | Status: ACTIVE | Noted: 2018-01-19

## 2018-09-24 ENCOUNTER — APPOINTMENT (OUTPATIENT)
Dept: ENDOCRINOLOGY | Facility: CLINIC | Age: 33
End: 2018-09-24

## 2018-10-04 ENCOUNTER — APPOINTMENT (OUTPATIENT)
Dept: ORTHOPEDIC SURGERY | Facility: CLINIC | Age: 33
End: 2018-10-04

## 2018-10-11 ENCOUNTER — RX RENEWAL (OUTPATIENT)
Age: 33
End: 2018-10-11

## 2018-10-11 RX ORDER — GABAPENTIN 100 MG/1
100 CAPSULE ORAL
Qty: 90 | Refills: 2 | Status: ACTIVE | COMMUNITY
Start: 2018-05-17 | End: 1900-01-01

## 2018-10-30 ENCOUNTER — RX RENEWAL (OUTPATIENT)
Age: 33
End: 2018-10-30

## 2018-10-30 RX ORDER — MELOXICAM 15 MG/1
15 TABLET ORAL
Qty: 30 | Refills: 1 | Status: ACTIVE | COMMUNITY
Start: 2017-11-02 | End: 1900-01-01

## 2018-11-05 ENCOUNTER — RX RENEWAL (OUTPATIENT)
Age: 33
End: 2018-11-05

## 2018-12-06 ENCOUNTER — APPOINTMENT (OUTPATIENT)
Dept: ORTHOPEDIC SURGERY | Facility: CLINIC | Age: 33
End: 2018-12-06

## 2019-01-01 NOTE — DISCHARGE NOTE ADULT - NS AS ACTIVITY OBS
negative Do not drive or operate machinery/Follow Dr. Long' s instruction sheet/Do not make important decisions/No Heavy lifting/straining/Stairs allowed/Walking-Outdoors allowed/Walking-Indoors allowed right upper chest wall port palpable- well healed scar overlying port. No skin changes to overlying nor surrounding skin.

## 2019-01-17 ENCOUNTER — RX RENEWAL (OUTPATIENT)
Age: 34
End: 2019-01-17

## 2019-01-17 ENCOUNTER — TRANSCRIPTION ENCOUNTER (OUTPATIENT)
Age: 34
End: 2019-01-17

## 2019-01-17 RX ORDER — GABAPENTIN 300 MG/1
300 CAPSULE ORAL 3 TIMES DAILY
Qty: 270 | Refills: 1 | Status: ACTIVE | COMMUNITY
Start: 2018-02-09

## 2019-01-22 ENCOUNTER — APPOINTMENT (OUTPATIENT)
Dept: ORTHOPEDIC SURGERY | Facility: CLINIC | Age: 34
End: 2019-01-22
Payer: COMMERCIAL

## 2019-01-22 PROCEDURE — 99214 OFFICE O/P EST MOD 30 MIN: CPT

## 2019-01-22 NOTE — ASSESSMENT
[FreeTextEntry1] : 33 yo female s/p laminectomy and discectomy with overall improved symptoms, but still impacting ADL's, recommend MRI of L Spine with without contrast. \par  \par

## 2019-01-22 NOTE — DISCUSSION/SUMMARY
[de-identified] : 32 yo female with scarring of Right L45, recommend f/u with pain management for Epidural Steroid Injection. Discussed case with Dr. Guerrier and settup appointment.

## 2019-01-22 NOTE — PHYSICAL EXAM
[] : Sensory: [L4-RT] : L4 [L5-RT] : L5 [de-identified] : TTP and incisional site and Right buttock\par NTTP L spine and b/l paraspinals L region. \par Skin intact L spine. No rashes, ulcers, blisters. \par No lymphedema. \par Posterior incision well healed\par  [de-identified] : EXAM: MR SPINE LUMBAR WAW IC \par \par \par PROCEDURE DATE: 09/17/2018 \par \par \par \par INTERPRETATION: \par LUMBOSACRAL SPINE MRI \par \par CLINICAL INFORMATION: Herniated disc. Status post surgery March 2018. \par TECHNIQUE: Multiplanar, multisequence MR imaging was obtained of the \par lumbosacral spine with and without the administration of intravenous \par contrast. 8 cc of Gadavist were administered intravenously. 2 cc were \par discarded. \par \par COMPARISON: Lumbar spine MRI dated 3/28/2018. \par \par FINDINGS: \par \par DISC LEVEL EVALUATION: \par \par T12/L1: No spinal canal or foraminal narrowing. \par L1/L2: No spinal canal or foraminal narrowing. \par L2/L3: No spinal canal or foraminal narrowing. \par L3/L4: No spinal canal or foraminal narrowing. \par L4/L5: Right hemilaminotomy defect is visualized. There is a disc \par protrusion, asymmetric to the right and mildly projects inferiorly, which \par appears decreased in size from prior exam. This mildly indents the thecal \par sac. No significant foraminal or lateral recess narrowing. There is \par postcontrast enhancement within the region of the right lateral recess, \par consistent with postsurgical changes. \par L5/S1: No spinal canal or foraminal narrowing. \par \par SPINAL ALIGNMENT: Hemilaminotomy defect is visualized on the right at L4-L5. \par Mild straightening of the lumbar lordosis. There is no listhesis. There is \par preservation of vertebral body heights. There is disc desiccation at L4-L5. \par DISTAL CORD AND CONUS: Distal cord terminates at L1. No cord signal \par abnormality. \par SI JOINTS: Sacroiliac joints are unremarkable. \par MARROW: There is no marrow signal abnormality. \par PARASPINAL MUSCLE AND SOFT TISSUES: There is no paraspinal muscle atrophy. \par Postsurgical changes are visualized within the soft tissues at L4-L5. \par INTRAABDOMINAL/INTRAPELVIC SOFT TISSUES: Unremarkable. \par \par IMPRESSION: \par Postsurgical interval decrease in size of L4/L5 right paracentral disc \par herniation. No significant spinal canal, lateral recess or foraminal \par narrowing. \par \par \par \par JAYDEN GARRISON M.D., ATTENDING RADIOLOGIST Phone #: \par This document has been electronically signed. Sep 19 2018 3:14PM \par

## 2019-01-22 NOTE — HISTORY OF PRESENT ILLNESS
[Physical Therapy] : relieved by physical therapy [Heat] : relieved by heat [5] : a minimum pain level of 5/10 [7] : a maximum pain level of 7/10 [Constant] : ~He/She~ states the symptoms seem to be constant [de-identified] : 33 y.o. female s/p lumbar laminectomy and discectomy L4-L5 on 03/25/18. Presents today for f/u results of lumbar spine MRI. Pt states symptoms are progressively worsening since last visit on 08/2018. She had overall improved symptoms after surgery, but still impacting ADL's. Pain is on right side of low back and right buttocks. Intermittent numbness and tingling on anterior aspect of RLE to foot involving all toes, Pt is taking gabapentin 800 mg at bedtime, took Percocet for 3 days. Pt participates with PT this provides mild relief in pain [Recumbency] : not relieved by recumbency [Incontinence] : no incontinence [Urinary Ret.] : no urinary retention [de-identified] : recumbency exacerbates pain

## 2019-04-07 ENCOUNTER — TRANSCRIPTION ENCOUNTER (OUTPATIENT)
Age: 34
End: 2019-04-07

## 2019-08-03 ENCOUNTER — EMERGENCY (EMERGENCY)
Facility: HOSPITAL | Age: 34
LOS: 1 days | Discharge: HOME | End: 2019-08-03
Attending: EMERGENCY MEDICINE | Admitting: EMERGENCY MEDICINE
Payer: COMMERCIAL

## 2019-08-03 VITALS
TEMPERATURE: 98 F | HEART RATE: 106 BPM | DIASTOLIC BLOOD PRESSURE: 89 MMHG | OXYGEN SATURATION: 99 % | SYSTOLIC BLOOD PRESSURE: 148 MMHG | RESPIRATION RATE: 18 BRPM

## 2019-08-03 VITALS
HEART RATE: 98 BPM | TEMPERATURE: 98 F | OXYGEN SATURATION: 99 % | DIASTOLIC BLOOD PRESSURE: 67 MMHG | SYSTOLIC BLOOD PRESSURE: 151 MMHG | RESPIRATION RATE: 18 BRPM

## 2019-08-03 DIAGNOSIS — Z90.49 ACQUIRED ABSENCE OF OTHER SPECIFIED PARTS OF DIGESTIVE TRACT: Chronic | ICD-10-CM

## 2019-08-03 PROCEDURE — 99284 EMERGENCY DEPT VISIT MOD MDM: CPT

## 2019-08-03 PROCEDURE — 70450 CT HEAD/BRAIN W/O DYE: CPT | Mod: 26

## 2019-08-03 RX ORDER — LIDOCAINE 4 G/100G
1 CREAM TOPICAL ONCE
Refills: 0 | Status: COMPLETED | OUTPATIENT
Start: 2019-08-03 | End: 2019-08-03

## 2019-08-03 RX ORDER — SODIUM CHLORIDE 9 MG/ML
1000 INJECTION INTRAMUSCULAR; INTRAVENOUS; SUBCUTANEOUS ONCE
Refills: 0 | Status: DISCONTINUED | OUTPATIENT
Start: 2019-08-03 | End: 2019-08-03

## 2019-08-03 RX ORDER — ONDANSETRON 8 MG/1
4 TABLET, FILM COATED ORAL ONCE
Refills: 0 | Status: DISCONTINUED | OUTPATIENT
Start: 2019-08-03 | End: 2019-08-03

## 2019-08-03 RX ORDER — CYCLOBENZAPRINE HYDROCHLORIDE 10 MG/1
5 TABLET, FILM COATED ORAL ONCE
Refills: 0 | Status: COMPLETED | OUTPATIENT
Start: 2019-08-03 | End: 2019-08-03

## 2019-08-03 RX ORDER — ACETAMINOPHEN 500 MG
650 TABLET ORAL ONCE
Refills: 0 | Status: COMPLETED | OUTPATIENT
Start: 2019-08-03 | End: 2019-08-03

## 2019-08-03 RX ORDER — ONDANSETRON 8 MG/1
4 TABLET, FILM COATED ORAL ONCE
Refills: 0 | Status: COMPLETED | OUTPATIENT
Start: 2019-08-03 | End: 2019-08-03

## 2019-08-03 RX ADMIN — ONDANSETRON 4 MILLIGRAM(S): 8 TABLET, FILM COATED ORAL at 21:19

## 2019-08-03 RX ADMIN — LIDOCAINE 1 PATCH: 4 CREAM TOPICAL at 21:19

## 2019-08-03 RX ADMIN — Medication 650 MILLIGRAM(S): at 22:20

## 2019-08-03 RX ADMIN — Medication 650 MILLIGRAM(S): at 21:19

## 2019-08-03 RX ADMIN — CYCLOBENZAPRINE HYDROCHLORIDE 5 MILLIGRAM(S): 10 TABLET, FILM COATED ORAL at 21:19

## 2019-08-03 NOTE — ED PROVIDER NOTE - NSFOLLOWUPINSTRUCTIONS_ED_ALL_ED_FT
You likely have a mild concussion. Follow up with your primary doctor as soon as possible and avoid any strenuous activities. If you have any severe increase in pain, fever, uncontrollable nausea vomiting, or inability to tolerate eating and drinking you need to come right back to the emergency room.

## 2019-08-03 NOTE — ED PROVIDER NOTE - PHYSICAL EXAMINATION
Neuro: CN2-12 grossly intact, A&Ox4, MS +5/5 in UE and LE BL, gross sensation intact in UE and LE BL, gait smooth and coordinated

## 2019-08-03 NOTE — ED PROVIDER NOTE - ATTENDING CONTRIBUTION TO CARE
Dr. Lewis:  I have personally performed a face to face bedside history and physical examination of this patient. I have discussed the history, examination, review of systems, assessment and plan of management with the resident. I have reviewed the electronic medical record and amended it to reflect my history, review of systems, physical exam, assessment and plan.    33F h/o DM presents after head trauma with headache and N/V.  Pt was taking something off a shelf, and felt something hit side of her head.  Denies LOC.  +dizziness.    Exam:  - nad  - rrr  - ctab   -abd soft ntnd  - no focal neuro deficits    A/P  - head trauma, unclear what object  - CT head, reassess

## 2019-08-03 NOTE — ED PROVIDER NOTE - OBJECTIVE STATEMENT
33 Y F with hx of DM here states that at 6pm she was getting things off a shelf, isn't sure what fell on her but it hit the right back of her head and shoulder area. She says that she was feeling ok after with some mild pain but then an hour ago started getting nauseated and decided to come here. She denies LOC, vomiting, vision changes, hearing changes, numbness, tingling or weakness. She says that she has been walking normally but feels a little dizzy now.

## 2019-08-03 NOTE — ED ADULT TRIAGE NOTE - CHIEF COMPLAINT QUOTE
pt states something fell on the back of my head and now I have this pain / pt does not know what hit her she lyed down because she was dizzy/  pt arrives vomiting  and having head pain  fs [id number]

## 2019-08-03 NOTE — ED ADULT NURSE NOTE - OBJECTIVE STATEMENT
32y/o female aaox4 and ambulatory c/o headache/R neck and shoulder pain. Pt states she was getting things from her shelf when something fell on her and hit her head. Pt states she does not know what hit her; denies falling. Pt states she feels dizzy and nauseous and the pain radiates from her head to R shoulder/neck. Pt denies SOB, palpitations, abdominal pain, back pain, vison changes, LOC. Respirations even and unlabored. Vital signs as noted. Pt medicated as per MD order. Bed in lowest position. Will continue to monitor.

## 2019-08-03 NOTE — ED PROVIDER NOTE - CLINICAL SUMMARY MEDICAL DECISION MAKING FREE TEXT BOX
33 Y F with DM had an object fall on her head/neck at 6pm with pain and dizziness now, likely concussion however seems to have some retrograde amnesia to the event. Normal neuro exam but given amnesia will get CTH, pain and nausea control. Dispo pending workup like DC with concussion precautions.

## 2019-08-03 NOTE — ED PROVIDER NOTE - PROGRESS NOTE DETAILS
Resident: Vinod Morales – Pt was re-evaluated at bedside, VSS, feeling better overall. Results were discussed with patient as well as return precautions and follow up plan with PCP and/or specialist. Time was taken to answer any questions that the patient had before providing them with discharge paperwork. Resident: Vinod Morales - Pt still complaining of pain does not want to go home. She still has no focal neuro complaints and just says she has a head ache, will give toradol and re-evaluate.

## 2019-08-04 RX ORDER — KETOROLAC TROMETHAMINE 30 MG/ML
15 SYRINGE (ML) INJECTION ONCE
Refills: 0 | Status: DISCONTINUED | OUTPATIENT
Start: 2019-08-04 | End: 2019-08-04

## 2019-08-04 RX ADMIN — Medication 15 MILLIGRAM(S): at 00:50

## 2020-01-01 NOTE — ED PROVIDER NOTE - CPE EDP MUSC NORM
"Tiki Daniel is a 2 week old female who is being evaluated via a billable telephone visit.      The parent/guardian has been notified of following:     \"This telephone visit will be conducted via a call between you, your child and your child's physician/provider. We have found that certain health care needs can be provided without the need for a physical exam.  This service lets us provide the care you need with a short phone conversation.  If a prescription is necessary we can send it directly to your pharmacy.  If lab work is needed we can place an order for that and you can then stop by our lab to have the test done at a later time.    Telephone visits are billed at different rates depending on your insurance coverage. During this emergency period, for some insurers they may be billed the same as an in-person visit.  Please reach out to your insurance provider with any questions.    If during the course of the call the physician/provider feels a telephone visit is not appropriate, you will not be charged for this service.\"    Parent/guardian has given verbal consent for Telephone visit?  Yes    What phone number would you like to be contacted at? [phone]    How would you like to obtain your AVS? MyChart    Subjective     Tiki Daniel is a 2 week old female who presents via phone visit today for the following health issues:    HPI      Concerns: Weight check.       Has been still sleepy but feeding even better    Review of Systems   Constitutional, HEENT, cardiovascular, pulmonary, gi and gu systems are negative, except as otherwise noted.       Objective          Vitals:  No vitals were obtained today due to virtual visit.    Mom reports healthy baby    Assessment/Plan:    2 week old with previously slow weight gain and new heart murmur    1) heart murmur echo WNL    2) slow weight gain improved and now back to birth weight 8oz past 6 days!  - can pump once in am and give this at night and " skip feeding for mom hoping to get 3-4 hr sleep    Over 50% of this visit was spent in face-to-face counseling and care coordination.  The total visit time was 15 min.  I provided therapeutic recommendations and education as per the plan noted here.    Oriana Dexter MD               normal...

## 2020-06-25 ENCOUNTER — APPOINTMENT (OUTPATIENT)
Dept: ORTHOPEDIC SURGERY | Facility: CLINIC | Age: 35
End: 2020-06-25
Payer: COMMERCIAL

## 2020-06-25 DIAGNOSIS — L90.5 SCAR CONDITIONS AND FIBROSIS OF SKIN: ICD-10-CM

## 2020-06-25 PROCEDURE — 99214 OFFICE O/P EST MOD 30 MIN: CPT

## 2020-06-25 PROCEDURE — 72100 X-RAY EXAM L-S SPINE 2/3 VWS: CPT

## 2020-06-25 RX ORDER — IBUPROFEN 800 MG/1
800 TABLET, FILM COATED ORAL 3 TIMES DAILY
Qty: 90 | Refills: 1 | Status: ACTIVE | COMMUNITY
Start: 2020-06-25 | End: 1900-01-01

## 2020-06-29 ENCOUNTER — APPOINTMENT (OUTPATIENT)
Dept: MRI IMAGING | Facility: CLINIC | Age: 35
End: 2020-06-29
Payer: COMMERCIAL

## 2020-06-29 ENCOUNTER — OUTPATIENT (OUTPATIENT)
Dept: OUTPATIENT SERVICES | Facility: HOSPITAL | Age: 35
LOS: 1 days | End: 2020-06-29
Payer: COMMERCIAL

## 2020-06-29 DIAGNOSIS — Z90.49 ACQUIRED ABSENCE OF OTHER SPECIFIED PARTS OF DIGESTIVE TRACT: Chronic | ICD-10-CM

## 2020-06-29 DIAGNOSIS — M54.16 RADICULOPATHY, LUMBAR REGION: ICD-10-CM

## 2020-06-29 PROCEDURE — 72158 MRI LUMBAR SPINE W/O & W/DYE: CPT | Mod: 26

## 2020-06-29 PROCEDURE — 72158 MRI LUMBAR SPINE W/O & W/DYE: CPT

## 2020-06-29 PROCEDURE — A9585: CPT

## 2020-07-27 ENCOUNTER — APPOINTMENT (OUTPATIENT)
Dept: ORTHOPEDIC SURGERY | Facility: CLINIC | Age: 35
End: 2020-07-27
Payer: COMMERCIAL

## 2020-07-27 DIAGNOSIS — M51.36 OTHER INTERVERTEBRAL DISC DEGENERATION, LUMBAR REGION: ICD-10-CM

## 2020-07-27 DIAGNOSIS — M54.16 RADICULOPATHY, LUMBAR REGION: ICD-10-CM

## 2020-07-27 PROCEDURE — 99214 OFFICE O/P EST MOD 30 MIN: CPT

## 2020-08-07 NOTE — PROGRESS NOTE ADULT - PROBLEM SELECTOR PLAN 1
Being admitted for inability to ambulate 2/2 acute intractable back pain  - Pain control PRN and PT consulted No

## 2020-08-14 ENCOUNTER — TRANSCRIPTION ENCOUNTER (OUTPATIENT)
Age: 35
End: 2020-08-14

## 2020-09-01 ENCOUNTER — RX RENEWAL (OUTPATIENT)
Age: 35
End: 2020-09-01

## 2020-09-01 ENCOUNTER — APPOINTMENT (OUTPATIENT)
Dept: ENDOCRINOLOGY | Facility: CLINIC | Age: 35
End: 2020-09-01
Payer: COMMERCIAL

## 2020-09-01 VITALS
DIASTOLIC BLOOD PRESSURE: 85 MMHG | HEART RATE: 124 BPM | HEIGHT: 64 IN | BODY MASS INDEX: 28.85 KG/M2 | SYSTOLIC BLOOD PRESSURE: 134 MMHG | WEIGHT: 169 LBS | OXYGEN SATURATION: 96 %

## 2020-09-01 DIAGNOSIS — Z83.3 FAMILY HISTORY OF DIABETES MELLITUS: ICD-10-CM

## 2020-09-01 LAB — GLUCOSE BLDC GLUCOMTR-MCNC: 207

## 2020-09-01 PROCEDURE — 36415 COLL VENOUS BLD VENIPUNCTURE: CPT

## 2020-09-01 PROCEDURE — 99205 OFFICE O/P NEW HI 60 MIN: CPT | Mod: 25

## 2020-09-01 PROCEDURE — 82962 GLUCOSE BLOOD TEST: CPT

## 2020-09-01 RX ORDER — GABAPENTIN 600 MG/1
600 TABLET, COATED ORAL 3 TIMES DAILY
Qty: 90 | Refills: 1 | Status: ACTIVE | COMMUNITY
Start: 2020-06-25 | End: 1900-01-01

## 2020-09-02 NOTE — ASSESSMENT
[FreeTextEntry1] : 35 year old female with past medical history of Diabetes Mellitus Type 2, Chronic back pain s/p surgery and chronic steroid injections who presents for management of her diabetes\par \par 1. DM 2- uncontrolled, uncomplicated\par Patient counseled on the importance of diabetic control and complications. Patient's diet and the necessary changes discussed. Reviewed over freestyle lyndsay and use. Reviewed over glycemic goals. Recommended that she get better glycemic control before considering surgery again.\par \par Inc Basaglar to 35 units QHS\par Cont Novolog\par Restart Metformin 1000 mg BID\par Check fsg QID\par Cont diabetic diet\par Cont exercise\par will check hga1c, microalbumin/cre, lipid, bmp,cbc\par Opthalmology Visit up to date\par Foot Exam up to date\par \par 2. HLD- will check levels\par \par Follow up in 2 weeks\par \par \par

## 2020-09-02 NOTE — PHYSICAL EXAM
[Alert] : alert [Well Nourished] : well nourished [No Acute Distress] : no acute distress [Well Developed] : well developed [Normal Sclera/Conjunctiva] : normal sclera/conjunctiva [EOMI] : extra ocular movement intact [No Proptosis] : no proptosis [Normal Oropharynx] : the oropharynx was normal [Thyroid Not Enlarged] : the thyroid was not enlarged [No Thyroid Nodules] : no palpable thyroid nodules [No Respiratory Distress] : no respiratory distress [No Accessory Muscle Use] : no accessory muscle use [Clear to Auscultation] : lungs were clear to auscultation bilaterally [Normal S1, S2] : normal S1 and S2 [Normal Rate] : heart rate was normal [Regular Rhythm] : with a regular rhythm [No Edema] : no peripheral edema [Pedal Pulses Normal] : the pedal pulses are present [Normal Bowel Sounds] : normal bowel sounds [Not Tender] : non-tender [Not Distended] : not distended [Soft] : abdomen soft [Normal Anterior Cervical Nodes] : no anterior cervical lymphadenopathy [Normal Posterior Cervical Nodes] : no posterior cervical lymphadenopathy [No Spinal Tenderness] : no spinal tenderness [Spine Straight] : spine straight [No Stigmata of Cushings Syndrome] : no stigmata of Cushings Syndrome [Normal Gait] : normal gait [Normal Strength/Tone] : muscle strength and tone were normal [No Rash] : no rash [Normal] : normal [Full ROM] : with full range of motion [Normal Reflexes] : deep tendon reflexes were 2+ and symmetric [No Tremors] : no tremors [Oriented x3] : oriented to person, place, and time [Acanthosis Nigricans] : no acanthosis nigricans [Diminished Throughout Both Feet] : normal tactile sensation with monofilament testing throughout both feet

## 2020-09-02 NOTE — HISTORY OF PRESENT ILLNESS
[FreeTextEntry1] : Ms. JACQUES DIAZ  is a 35 year old female with past medical history of Diabetes Mellitus Type 2, Chronic back pain s/p surgery and chronic steroid injections who presents for management of her diabetes. Patient recently was placed on steroid injections and is planned for surgery at the end of the month. Patient denies any history of diabetic retinopathy, nephropathy or neuropathy. He denies any blurry vision, polyuria, polydipsia and numbness/tingling in the extremities.\par \par \par POCT Glucose: 207 mg/dL, breakfast with burger from [employer], potatoes, oatmeal raisin cookie before visit\par POCT Hga1c: %\par \par \par Diabetes first diagnosed: at age 18\par Type: 2 \par \par Current diabetic regimen:\par Basaglar 28 units QHS\par Humalog 23-24, inc to 28 during steroid injections\par Other relevant medications:\par \par Self monitoring blood glucose : 4x times per day:\par \par SMBG:\par Pre-breakfast: 200s-300s\par Pre-lunch: 300s\par Pre-dinner:300s\par bedtime:300s\par \par Hypoglycemia:none\par \par \par Diet: \par Breakfast: skips mostly\par Lunch: rice and chicken or beef, cereal\par Dinner: rice and meat or fish, eggplants, string bean\par Snacks: chips, or crackers, banana, apples\par \par Exercise:\par \par Urine micro:NA\par lipid profile:NA\par last hba1c:NA\par eye exam: 8/2020\par diabetic foot exam/podiatry:NA\par \par \par \par \par

## 2020-09-03 LAB
ALBUMIN SERPL ELPH-MCNC: 4.6 G/DL
ALP BLD-CCNC: 64 U/L
ALT SERPL-CCNC: 27 U/L
ANION GAP SERPL CALC-SCNC: 17 MMOL/L
AST SERPL-CCNC: 27 U/L
BASOPHILS # BLD AUTO: 0.05 K/UL
BASOPHILS NFR BLD AUTO: 0.5 %
BILIRUB SERPL-MCNC: 0.2 MG/DL
BUN SERPL-MCNC: 12 MG/DL
CALCIUM SERPL-MCNC: 9.3 MG/DL
CHLORIDE SERPL-SCNC: 96 MMOL/L
CHOLEST SERPL-MCNC: 231 MG/DL
CHOLEST/HDLC SERPL: 3.1 RATIO
CO2 SERPL-SCNC: 24 MMOL/L
CREAT SERPL-MCNC: 0.4 MG/DL
CREAT SPEC-SCNC: 65 MG/DL
EOSINOPHIL # BLD AUTO: 0.01 K/UL
EOSINOPHIL NFR BLD AUTO: 0.1 %
ESTIMATED AVERAGE GLUCOSE: 321 MG/DL
GLUCOSE SERPL-MCNC: 239 MG/DL
HBA1C MFR BLD HPLC: 12.8 %
HCT VFR BLD CALC: 45.9 %
HDLC SERPL-MCNC: 75 MG/DL
HGB BLD-MCNC: 15.4 G/DL
IMM GRANULOCYTES NFR BLD AUTO: 0.5 %
LDLC SERPL CALC-MCNC: 118 MG/DL
LYMPHOCYTES # BLD AUTO: 2.18 K/UL
LYMPHOCYTES NFR BLD AUTO: 20.7 %
MAN DIFF?: NORMAL
MCHC RBC-ENTMCNC: 30.1 PG
MCHC RBC-ENTMCNC: 33.6 GM/DL
MCV RBC AUTO: 89.8 FL
MICROALBUMIN 24H UR DL<=1MG/L-MCNC: 3.6 MG/DL
MICROALBUMIN/CREAT 24H UR-RTO: 55 MG/G
MONOCYTES # BLD AUTO: 1.15 K/UL
MONOCYTES NFR BLD AUTO: 10.9 %
NEUTROPHILS # BLD AUTO: 7.08 K/UL
NEUTROPHILS NFR BLD AUTO: 67.3 %
PLATELET # BLD AUTO: 300 K/UL
POTASSIUM SERPL-SCNC: 4.7 MMOL/L
PROT SERPL-MCNC: 7.4 G/DL
RBC # BLD: 5.11 M/UL
RBC # FLD: 11.8 %
SODIUM SERPL-SCNC: 137 MMOL/L
TRIGL SERPL-MCNC: 190 MG/DL
WBC # FLD AUTO: 10.52 K/UL

## 2020-09-10 ENCOUNTER — APPOINTMENT (OUTPATIENT)
Dept: ENDOCRINOLOGY | Facility: CLINIC | Age: 35
End: 2020-09-10
Payer: COMMERCIAL

## 2020-09-10 VITALS
BODY MASS INDEX: 28.85 KG/M2 | DIASTOLIC BLOOD PRESSURE: 85 MMHG | HEART RATE: 80 BPM | SYSTOLIC BLOOD PRESSURE: 143 MMHG | HEIGHT: 64 IN | WEIGHT: 169 LBS | OXYGEN SATURATION: 98 %

## 2020-09-10 PROCEDURE — 82962 GLUCOSE BLOOD TEST: CPT

## 2020-09-10 PROCEDURE — 99214 OFFICE O/P EST MOD 30 MIN: CPT | Mod: 25

## 2020-09-10 NOTE — ASSESSMENT
[FreeTextEntry1] : 35 year old female with past medical history of Diabetes Mellitus Type 2, Chronic back pain s/p surgery and chronic steroid injections who presents for management of her diabetes\par \par 1. DM 2- uncontrolled, uncomplicated\par Patient counseled on the importance of diabetic control and complications. Patient's diet and the necessary changes discussed. Reviewed over freestyle lyndsay and use. Reviewed over glycemic goals. Recommended that she get better glycemic control before considering surgery again. She was planned for surgery on 9/21 but its too early to give her surgical clearance with hga1c of 12.8%. Need more consistency in her blood glucose levels.\par \par Cont Basaglar to 35 units QHS\par Will send for more concentrated Humalog 200 units/mL\par Cont Humalog 25 and inc by 5 units if post-prandial fsg >200\par Cont Metformin 1000 mg BID\par Check fsg QID\par Cont diabetic diet\par Cont exercise\par Labs UTD\par Opthalmology Visit up to date\par Foot Exam up to date\par \par 2. HLD- elevated\par Start Atorvastatin 20 mg QD\par \par Follow up in 2 weeks\par \par \par

## 2020-09-10 NOTE — HISTORY OF PRESENT ILLNESS
[FreeTextEntry1] : Ms. JACQUES DIAZ  is a 35 year old female with past medical history of Diabetes Mellitus Type 2, Chronic back pain s/p surgery and chronic steroid injections who presents for management of her diabetes.\par \par \par POCT Glucose: 161 mg/dL, breakfast with noodles\par POCT Hga1c: %\par \par \par Diabetes first diagnosed: at age 18\par Type: 2 \par \par Current diabetic regimen:\par Basaglar 28 units QHS\par Humalog 25, inc to 28 during steroid injections\par Other relevant medications:\par \par Self monitoring blood glucose : Patient is using lyndsay for less then a week\par \par SMBG:\par Pre-breakfast: 100s\par Pre-lunch: 200s\par Pre-dinner:200s\par bedtime:200s\par \par Hypoglycemia:none\par \par \par Diet: \par Breakfast: skips mostly\par Lunch: rice and chicken or beef, cereal\par Dinner: rice and meat or fish, eggplants, string bean\par Snacks: chips, or crackers, banana, apples\par \par Exercise:\par \par Urine micro: 55 mg/g (8/2020)\par lipid profile: 118 (8/2020)\par last hba1c: 12.8% (8/2020)\par eye exam: 8/2020\par diabetic foot exam/podiatry: in office (8/2020)\par \par \par \par \par

## 2020-09-10 NOTE — PHYSICAL EXAM
[Alert] : alert [No Acute Distress] : no acute distress [Well Nourished] : well nourished [Well Developed] : well developed [Normal Sclera/Conjunctiva] : normal sclera/conjunctiva [EOMI] : extra ocular movement intact [No Proptosis] : no proptosis [Thyroid Not Enlarged] : the thyroid was not enlarged [Normal Oropharynx] : the oropharynx was normal [No Thyroid Nodules] : no palpable thyroid nodules [No Respiratory Distress] : no respiratory distress [No Accessory Muscle Use] : no accessory muscle use [Clear to Auscultation] : lungs were clear to auscultation bilaterally [Normal S1, S2] : normal S1 and S2 [Normal Rate] : heart rate was normal [No Edema] : no peripheral edema [Regular Rhythm] : with a regular rhythm [Normal Bowel Sounds] : normal bowel sounds [Pedal Pulses Normal] : the pedal pulses are present [Not Tender] : non-tender [Normal Anterior Cervical Nodes] : no anterior cervical lymphadenopathy [Soft] : abdomen soft [Not Distended] : not distended [Normal Posterior Cervical Nodes] : no posterior cervical lymphadenopathy [No Spinal Tenderness] : no spinal tenderness [Spine Straight] : spine straight [No Stigmata of Cushings Syndrome] : no stigmata of Cushings Syndrome [Normal Gait] : normal gait [Normal Strength/Tone] : muscle strength and tone were normal [No Rash] : no rash [Acanthosis Nigricans] : no acanthosis nigricans [Normal Reflexes] : deep tendon reflexes were 2+ and symmetric [No Tremors] : no tremors [Oriented x3] : oriented to person, place, and time

## 2020-09-22 ENCOUNTER — APPOINTMENT (OUTPATIENT)
Dept: ENDOCRINOLOGY | Facility: CLINIC | Age: 35
End: 2020-09-22

## 2020-12-02 ENCOUNTER — APPOINTMENT (OUTPATIENT)
Dept: ENDOCRINOLOGY | Facility: CLINIC | Age: 35
End: 2020-12-02
Payer: COMMERCIAL

## 2020-12-02 ENCOUNTER — RESULT CHARGE (OUTPATIENT)
Age: 35
End: 2020-12-02

## 2020-12-02 VITALS
DIASTOLIC BLOOD PRESSURE: 64 MMHG | HEIGHT: 64 IN | WEIGHT: 169 LBS | RESPIRATION RATE: 14 BRPM | OXYGEN SATURATION: 97 % | HEART RATE: 97 BPM | BODY MASS INDEX: 28.85 KG/M2 | SYSTOLIC BLOOD PRESSURE: 110 MMHG

## 2020-12-02 VITALS
HEIGHT: 64 IN | BODY MASS INDEX: 32.44 KG/M2 | DIASTOLIC BLOOD PRESSURE: 89 MMHG | OXYGEN SATURATION: 97 % | HEART RATE: 81 BPM | SYSTOLIC BLOOD PRESSURE: 143 MMHG | WEIGHT: 190 LBS

## 2020-12-02 PROCEDURE — 99214 OFFICE O/P EST MOD 30 MIN: CPT | Mod: 25

## 2020-12-02 PROCEDURE — 83036 HEMOGLOBIN GLYCOSYLATED A1C: CPT | Mod: QW

## 2020-12-02 PROCEDURE — 36415 COLL VENOUS BLD VENIPUNCTURE: CPT

## 2020-12-02 PROCEDURE — 82962 GLUCOSE BLOOD TEST: CPT

## 2020-12-02 PROCEDURE — 99072 ADDL SUPL MATRL&STAF TM PHE: CPT

## 2020-12-03 NOTE — ASSESSMENT
[FreeTextEntry1] : 35 year old female with past medical history of Diabetes Mellitus Type 2, Chronic back pain s/p surgery and chronic steroid injections who presents for management of her diabetes\par \par 1. DM 2- uncontrolled, uncomplicated\par Patient counseled on the importance of diabetic control and complications. Patient's diet and the necessary changes discussed. Reviewed over glycemic goals. Discussed if she interested in pump. She will think about it.\par \par Cont Basaglar 35 units QHS\par Cont Humalog 25 \par Cont Metformin 1000 mg BID\par Check fsg QID\par Cont diabetic diet\par Cont exercise\par Labs today\par Opthalmology Visit up to date\par Foot Exam up to date\par \par 2. HLD- elevated\par Start Atorvastatin 20 mg QD\par \par Follow up in 3 months\par \par \par

## 2020-12-03 NOTE — HISTORY OF PRESENT ILLNESS
[FreeTextEntry1] : Ms. JACQUES DIAZ  is a 35 year old female with past medical history of Diabetes Mellitus Type 2, Chronic back pain s/p surgery and chronic steroid injections who presents for management of her diabetes.\par \par \par POCT Glucose: 177 mg/dL\par POCT Hga1c: %\par \par \par Diabetes first diagnosed: at age 20\par Type: 2 \par \par Current diabetic regimen:\par Basaglar 35 units QHS\par Novolog 25 TID w/ meals\par Metformin 1000 mg BID\par Other relevant medications:\par \par Self monitoring blood glucose : Patient is using meter\par \par SMBG:\par Pre-breakfast: \par Pre-lunch: 200s\par Pre-dinner:200s\par bedtime:200s\par \par Hypoglycemia:none\par \par \par Diet: \par Breakfast: skips mostly\par Lunch: rice and chicken or beef, cereal\par Dinner: rice and meat or fish, eggplants, string bean\par Snacks: chips, or crackers, banana, apples\par \par Exercise:\par \par Urine micro: 55 mg/g (8/2020)\par lipid profile: 118 (8/2020)\par last hba1c: 7.9% (12/2020), 12.8% (8/2020)\par eye exam: 8/2020\par diabetic foot exam/podiatry: in office (8/2020)\par \par \par \par \par

## 2020-12-15 LAB
ALBUMIN SERPL ELPH-MCNC: 4.4 G/DL
ALP BLD-CCNC: 72 U/L
ALT SERPL-CCNC: 46 U/L
ANION GAP SERPL CALC-SCNC: 14 MMOL/L
AST SERPL-CCNC: 65 U/L
BILIRUB SERPL-MCNC: 0.3 MG/DL
BUN SERPL-MCNC: 9 MG/DL
C PEPTIDE SERPL-MCNC: 1.7 NG/ML
CALCIUM SERPL-MCNC: 9.7 MG/DL
CHLORIDE SERPL-SCNC: 95 MMOL/L
CO2 SERPL-SCNC: 24 MMOL/L
CREAT SERPL-MCNC: 0.44 MG/DL
FRUCTOSAMINE SERPL-MCNC: 364 UMOL/L
GAD65 AB SER-MCNC: 0 NMOL/L
GLUCOSE SERPL-MCNC: 326 MG/DL
PANC ISLET CELL AB SER QL: NORMAL
POTASSIUM SERPL-SCNC: 4.9 MMOL/L
PROT SERPL-MCNC: 7.4 G/DL
SODIUM SERPL-SCNC: 133 MMOL/L

## 2020-12-23 LAB — INSULIN AB SER IA-ACNC: <0.4 U/ML

## 2020-12-30 ENCOUNTER — RX RENEWAL (OUTPATIENT)
Age: 35
End: 2020-12-30

## 2021-01-15 ENCOUNTER — APPOINTMENT (OUTPATIENT)
Dept: CT IMAGING | Facility: CLINIC | Age: 36
End: 2021-01-15

## 2021-01-15 ENCOUNTER — APPOINTMENT (OUTPATIENT)
Dept: MRI IMAGING | Facility: CLINIC | Age: 36
End: 2021-01-15

## 2021-01-26 ENCOUNTER — APPOINTMENT (OUTPATIENT)
Dept: MRI IMAGING | Facility: CLINIC | Age: 36
End: 2021-01-26
Payer: COMMERCIAL

## 2021-01-26 ENCOUNTER — APPOINTMENT (OUTPATIENT)
Dept: CT IMAGING | Facility: CLINIC | Age: 36
End: 2021-01-26
Payer: COMMERCIAL

## 2021-01-26 ENCOUNTER — OUTPATIENT (OUTPATIENT)
Dept: OUTPATIENT SERVICES | Facility: HOSPITAL | Age: 36
LOS: 1 days | End: 2021-01-26
Payer: COMMERCIAL

## 2021-01-26 DIAGNOSIS — Z00.8 ENCOUNTER FOR OTHER GENERAL EXAMINATION: ICD-10-CM

## 2021-01-26 DIAGNOSIS — Z90.49 ACQUIRED ABSENCE OF OTHER SPECIFIED PARTS OF DIGESTIVE TRACT: Chronic | ICD-10-CM

## 2021-01-26 PROCEDURE — 72131 CT LUMBAR SPINE W/O DYE: CPT

## 2021-01-26 PROCEDURE — 72131 CT LUMBAR SPINE W/O DYE: CPT | Mod: 26

## 2021-01-26 PROCEDURE — 72158 MRI LUMBAR SPINE W/O & W/DYE: CPT | Mod: 26

## 2021-01-26 PROCEDURE — A9585: CPT

## 2021-01-26 PROCEDURE — 76376 3D RENDER W/INTRP POSTPROCES: CPT

## 2021-01-26 PROCEDURE — 76376 3D RENDER W/INTRP POSTPROCES: CPT | Mod: 26

## 2021-01-26 PROCEDURE — 72158 MRI LUMBAR SPINE W/O & W/DYE: CPT

## 2021-02-01 ENCOUNTER — RX RENEWAL (OUTPATIENT)
Age: 36
End: 2021-02-01

## 2021-02-05 ENCOUNTER — RX RENEWAL (OUTPATIENT)
Age: 36
End: 2021-02-05

## 2021-03-02 ENCOUNTER — APPOINTMENT (OUTPATIENT)
Dept: ENDOCRINOLOGY | Facility: CLINIC | Age: 36
End: 2021-03-02
Payer: COMMERCIAL

## 2021-03-02 VITALS
BODY MASS INDEX: 33.63 KG/M2 | DIASTOLIC BLOOD PRESSURE: 91 MMHG | SYSTOLIC BLOOD PRESSURE: 149 MMHG | HEART RATE: 109 BPM | WEIGHT: 197 LBS | OXYGEN SATURATION: 99 % | HEIGHT: 64 IN

## 2021-03-02 PROCEDURE — 83036 HEMOGLOBIN GLYCOSYLATED A1C: CPT | Mod: QW

## 2021-03-02 PROCEDURE — 99072 ADDL SUPL MATRL&STAF TM PHE: CPT

## 2021-03-02 PROCEDURE — 82962 GLUCOSE BLOOD TEST: CPT

## 2021-03-02 PROCEDURE — 99214 OFFICE O/P EST MOD 30 MIN: CPT | Mod: 25

## 2021-03-02 NOTE — HISTORY OF PRESENT ILLNESS
[FreeTextEntry1] : Ms. JACQUES DIAZ  is a 35 year old female with past medical history of Diabetes Mellitus Type 2, Chronic back pain s/p surgery and chronic steroid injections who presents for management of her diabetes.\par \par \par POCT Glucose: 177 mg/dL\par POCT Hga1c: 11.8%\par \par \par Diabetes first diagnosed: at age 20\par Type: 2 \par \par Current diabetic regimen:\par Basaglar 35 units QHS\par Novolog 25 TID w/ meals\par Metformin 1000 mg BID\par Other relevant medications:\par \par Self monitoring blood glucose : Patient is using meter checks here and there\par \par SMBG:\par Pre-breakfast: 100s\par Pre-lunch: 200s\par Pre-dinner:200s\par bedtime:200s\par \par Hypoglycemia:none\par \par \par Diet: \par Breakfast: skips mostly\par Lunch: rice and chicken or beef, cereal\par Dinner: rice and meat or fish, eggplants, string bean\par Snacks: chips, or crackers, banana, apples\par \par Exercise:\par \par Urine micro: 55 mg/g (9/2020)\par lipid profile: 118 (8/2020)\par last hba1c: 11.8% (3/2021), 7.9% (12/2020), 12.8% (8/2020)\par eye exam: 8/2020\par diabetic foot exam/podiatry: in office (8/2020)\par \par \par \par \par

## 2021-03-02 NOTE — ASSESSMENT
[FreeTextEntry1] : 35 year old female with past medical history of Diabetes Mellitus Type 2, Chronic back pain s/p surgery and chronic steroid injections who presents for management of her diabetes\par \par 1. DM 2- uncontrolled, uncomplicated\par Patient counseled on the importance of diabetic control and complications. Patient's diet and the necessary changes discussed. Reviewed over glycemic goals. Discussed GLP-1 to decrease insulin requirements and improve hga1c.\par \par Cont Basaglar 35 units QHS\par Cont Humalog 25 TID\par Cont Metformin 1000 mg BID\par Start Trulicity\par Check fsg QID\par Cont diabetic diet\par Cont exercise\par Labs today\par Opthalmology Visit up to date\par Foot Exam up to date\par \par 2. HLD- elevated\par Cont Atorvastatin 20 mg QD\par \par Follow up in 3 months\par \par \par

## 2021-03-12 ENCOUNTER — NON-APPOINTMENT (OUTPATIENT)
Age: 36
End: 2021-03-12

## 2021-04-19 ENCOUNTER — APPOINTMENT (OUTPATIENT)
Dept: ENDOCRINOLOGY | Facility: CLINIC | Age: 36
End: 2021-04-19
Payer: COMMERCIAL

## 2021-04-19 VITALS — HEIGHT: 64 IN | WEIGHT: 190 LBS | BODY MASS INDEX: 32.44 KG/M2

## 2021-04-19 PROCEDURE — G0108 DIAB MANAGE TRN  PER INDIV: CPT

## 2021-04-19 PROCEDURE — 99072 ADDL SUPL MATRL&STAF TM PHE: CPT

## 2021-05-03 ENCOUNTER — APPOINTMENT (OUTPATIENT)
Dept: ENDOCRINOLOGY | Facility: CLINIC | Age: 36
End: 2021-05-03
Payer: COMMERCIAL

## 2021-05-03 PROCEDURE — G0108 DIAB MANAGE TRN  PER INDIV: CPT

## 2021-05-03 PROCEDURE — 99072 ADDL SUPL MATRL&STAF TM PHE: CPT

## 2021-05-18 ENCOUNTER — APPOINTMENT (OUTPATIENT)
Dept: ENDOCRINOLOGY | Facility: CLINIC | Age: 36
End: 2021-05-18

## 2021-05-20 ENCOUNTER — APPOINTMENT (OUTPATIENT)
Dept: ENDOCRINOLOGY | Facility: CLINIC | Age: 36
End: 2021-05-20
Payer: COMMERCIAL

## 2021-05-20 PROCEDURE — G0108 DIAB MANAGE TRN  PER INDIV: CPT

## 2021-06-02 ENCOUNTER — APPOINTMENT (OUTPATIENT)
Dept: ENDOCRINOLOGY | Facility: CLINIC | Age: 36
End: 2021-06-02
Payer: COMMERCIAL

## 2021-06-02 VITALS — SYSTOLIC BLOOD PRESSURE: 142 MMHG | DIASTOLIC BLOOD PRESSURE: 93 MMHG | OXYGEN SATURATION: 98 % | HEART RATE: 105 BPM

## 2021-06-02 PROCEDURE — 99215 OFFICE O/P EST HI 40 MIN: CPT | Mod: 25

## 2021-06-02 PROCEDURE — 82962 GLUCOSE BLOOD TEST: CPT

## 2021-06-02 NOTE — ASSESSMENT
[FreeTextEntry1] : 35 year old female with past medical history of Diabetes Mellitus Type 2, Chronic back pain s/p surgery and chronic steroid injections who presents for management of her diabetes\par \par 1. DM 2- uncontrolled, uncomplicated\par Patient counseled on the importance of diabetic control and complications. Patient's diet and the necessary changes discussed. Reviewed over glycemic goals. \par She will going on Tandem pump next week\par \par Switch to Tresiba 50 units QHS\par Inc to Novalog 35 TID\par Cont Metformin 1000 mg BID\par Check fsg QID\par Cont diabetic diet\par Cont exercise\par Labs today\par Opthalmology Visit up to date\par Foot Exam up to date\par \par 2. HLD- elevated\par Cont Atorvastatin 20 mg QD\par \par Follow up in 3 weeks\par \par \par

## 2021-06-02 NOTE — HISTORY OF PRESENT ILLNESS
[FreeTextEntry1] : Ms. JACQUES DIAZ  is a 35 year old female with past medical history of Diabetes Mellitus Type 2, Chronic back pain s/p surgery and chronic steroid injections who presents for management of her diabetes. Patient is receiving steroid injections for her severe back pain. Her last injection was in April.\par \par \par POCT Glucose: 282 mg/dL\par POCT Hga1c: %\par \par \par Diabetes first diagnosed: at age 20\par Type: 2 \par \par Current diabetic regimen:\par Basaglar 45 units QHS\par Novolog 30 TID w/ meals\par Metformin 1000 mg BID\par Trulicity (nausea and vomiting)\par Other relevant medications:\par \par Self monitoring blood glucose : Patient is using Dexcom\par \par AGP Report: \par (5/20/21-5/30/21)\par Very High: 49.2%\par High: 89.2%\par Target (): 10.4%\par Low: 0%\par \par \par Hypoglycemia:none\par \par \par Diet: \par Breakfast: skips mostly\par Lunch: rice and chicken or beef, cereal\par Dinner: rice and meat or fish, eggplants, string bean\par Snacks: chips, or crackers, banana, apples\par \par Exercise:\par \par Urine micro: 55 mg/g (9/2020)\par lipid profile: 118 (8/2020)\par last hba1c: 11.8% (3/2021), 7.9% (12/2020), 12.8% (8/2020)\par eye exam: 8/2020\par diabetic foot exam/podiatry: in office (8/2020)\par \par \par \par \par

## 2021-06-03 ENCOUNTER — RX RENEWAL (OUTPATIENT)
Age: 36
End: 2021-06-03

## 2021-06-03 LAB
ALBUMIN SERPL ELPH-MCNC: 4.2 G/DL
ALP BLD-CCNC: 68 U/L
ALT SERPL-CCNC: 36 U/L
ANION GAP SERPL CALC-SCNC: 15 MMOL/L
AST SERPL-CCNC: 30 U/L
BASOPHILS # BLD AUTO: 0.07 K/UL
BASOPHILS NFR BLD AUTO: 0.8 %
BILIRUB SERPL-MCNC: 0.3 MG/DL
BUN SERPL-MCNC: 9 MG/DL
CALCIUM SERPL-MCNC: 9.3 MG/DL
CHLORIDE SERPL-SCNC: 98 MMOL/L
CHOLEST SERPL-MCNC: 148 MG/DL
CO2 SERPL-SCNC: 23 MMOL/L
CREAT SERPL-MCNC: 0.42 MG/DL
CREAT SPEC-SCNC: 41 MG/DL
EOSINOPHIL # BLD AUTO: 0.15 K/UL
EOSINOPHIL NFR BLD AUTO: 1.7 %
ESTIMATED AVERAGE GLUCOSE: 246 MG/DL
GLUCOSE SERPL-MCNC: 334 MG/DL
HBA1C MFR BLD HPLC: 10.2 %
HCT VFR BLD CALC: 42.1 %
HDLC SERPL-MCNC: 43 MG/DL
HGB BLD-MCNC: 13.8 G/DL
IMM GRANULOCYTES NFR BLD AUTO: 0.3 %
LDLC SERPL CALC-MCNC: 65 MG/DL
LYMPHOCYTES # BLD AUTO: 3.36 K/UL
LYMPHOCYTES NFR BLD AUTO: 38.4 %
MAN DIFF?: NORMAL
MCHC RBC-ENTMCNC: 29.4 PG
MCHC RBC-ENTMCNC: 32.8 GM/DL
MCV RBC AUTO: 89.6 FL
MICROALBUMIN 24H UR DL<=1MG/L-MCNC: 2.3 MG/DL
MICROALBUMIN/CREAT 24H UR-RTO: 55 MG/G
MONOCYTES # BLD AUTO: 0.59 K/UL
MONOCYTES NFR BLD AUTO: 6.7 %
NEUTROPHILS # BLD AUTO: 4.56 K/UL
NEUTROPHILS NFR BLD AUTO: 52.1 %
NONHDLC SERPL-MCNC: 104 MG/DL
PLATELET # BLD AUTO: 279 K/UL
POTASSIUM SERPL-SCNC: 4.7 MMOL/L
PROT SERPL-MCNC: 7.1 G/DL
RBC # BLD: 4.7 M/UL
RBC # FLD: 12.8 %
SODIUM SERPL-SCNC: 136 MMOL/L
TRIGL SERPL-MCNC: 195 MG/DL
WBC # FLD AUTO: 8.76 K/UL

## 2021-06-09 ENCOUNTER — APPOINTMENT (OUTPATIENT)
Dept: ENDOCRINOLOGY | Facility: CLINIC | Age: 36
End: 2021-06-09
Payer: COMMERCIAL

## 2021-06-09 ENCOUNTER — APPOINTMENT (OUTPATIENT)
Dept: ENDOCRINOLOGY | Facility: CLINIC | Age: 36
End: 2021-06-09

## 2021-06-09 PROCEDURE — P0004: CPT

## 2021-06-10 ENCOUNTER — NON-APPOINTMENT (OUTPATIENT)
Age: 36
End: 2021-06-10

## 2021-06-11 ENCOUNTER — NON-APPOINTMENT (OUTPATIENT)
Age: 36
End: 2021-06-11

## 2021-06-14 ENCOUNTER — NON-APPOINTMENT (OUTPATIENT)
Age: 36
End: 2021-06-14

## 2021-06-17 ENCOUNTER — NON-APPOINTMENT (OUTPATIENT)
Age: 36
End: 2021-06-17

## 2021-06-22 ENCOUNTER — NON-APPOINTMENT (OUTPATIENT)
Age: 36
End: 2021-06-22

## 2021-06-28 ENCOUNTER — NON-APPOINTMENT (OUTPATIENT)
Age: 36
End: 2021-06-28

## 2021-07-08 ENCOUNTER — APPOINTMENT (OUTPATIENT)
Dept: ENDOCRINOLOGY | Facility: CLINIC | Age: 36
End: 2021-07-08
Payer: COMMERCIAL

## 2021-07-08 VITALS
OXYGEN SATURATION: 98 % | WEIGHT: 192 LBS | HEART RATE: 104 BPM | BODY MASS INDEX: 32.96 KG/M2 | SYSTOLIC BLOOD PRESSURE: 123 MMHG | DIASTOLIC BLOOD PRESSURE: 86 MMHG

## 2021-07-08 PROCEDURE — 95251 CONT GLUC MNTR ANALYSIS I&R: CPT

## 2021-07-08 PROCEDURE — 99214 OFFICE O/P EST MOD 30 MIN: CPT | Mod: 25

## 2021-07-09 NOTE — ASSESSMENT
[FreeTextEntry1] : 35 year old female with past medical history of Diabetes Mellitus Type 2, Chronic back pain s/p surgery and chronic steroid injections who presents for management of her diabetes\par \par 1. DM 2- uncontrolled, uncomplicated\par Patient counseled on the importance of diabetic control and complications. Patient's diet and the necessary changes discussed. Reviewed over glycemic goals. \par Will try GLP-1 to reduce insulin resistance\par Was intolerant to Trulicity. will try Ozempic\par Cont current pump settings\par Check fsg QID\par Cont diabetic diet\par Cont exercise\par Labs today\par Opthalmology Visit up to date\par Foot Exam up to date\par \par 2. HLD- elevated\par Cont Atorvastatin 20 mg QD\par \par Follow up in 3 weeks with [facility]\par \par \par

## 2021-07-09 NOTE — HISTORY OF PRESENT ILLNESS
[FreeTextEntry1] : Ms. JACQUES DIAZ  is a 35 year old female with past medical history of Diabetes Mellitus Type 2, Chronic back pain s/p surgery and chronic steroid injections who presents for management of her diabetes.\par \par \par POCT Glucose: 282 mg/dL\par POCT Hga1c: %\par \par \par Diabetes first diagnosed: at age 20\par Type: 2 \par \par Current diabetic regimen:\par Tandem pump with Humalog\par Other relevant medications:\par \par Self monitoring blood glucose : Patient is using Dexcom\par \par AGP Report: \par (6/21/21-7/04/21)\par High: 75%\par Target (): 25%\par Low: 0%\par \par \par Hypoglycemia:none\par \par \par Diet: \par Breakfast: skips mostly\par Lunch: rice and chicken or beef, cereal\par Dinner: rice and meat or fish, eggplants, string bean\par Snacks: chips, or crackers, banana, apples\par \par Exercise:\par \par Urine micro: 55 mg/g (6/2021),  55 mg/g (9/2020)\par lipid profile: LDL 65 (6/2021), 118 (8/2020)\par last hba1c: 10.2% (5/2021) 11.8% (3/2021), 7.9% (12/2020), 12.8% (8/2020)\par eye exam: 8/2020\par diabetic foot exam/podiatry: in office (8/2020)\par \par \par \par \par

## 2021-07-13 ENCOUNTER — NON-APPOINTMENT (OUTPATIENT)
Age: 36
End: 2021-07-13

## 2021-08-03 ENCOUNTER — APPOINTMENT (OUTPATIENT)
Dept: ENDOCRINOLOGY | Facility: CLINIC | Age: 36
End: 2021-08-03
Payer: COMMERCIAL

## 2021-08-03 PROCEDURE — G0108 DIAB MANAGE TRN  PER INDIV: CPT

## 2021-08-03 RX ORDER — INSULIN LISPRO 100 U/ML
100 INJECTION, SOLUTION SUBCUTANEOUS
Qty: 5 | Refills: 2 | Status: DISCONTINUED | COMMUNITY
Start: 2020-09-22 | End: 2021-08-03

## 2021-08-06 ENCOUNTER — NON-APPOINTMENT (OUTPATIENT)
Age: 36
End: 2021-08-06

## 2021-08-23 ENCOUNTER — APPOINTMENT (OUTPATIENT)
Dept: ENDOCRINOLOGY | Facility: CLINIC | Age: 36
End: 2021-08-23

## 2021-09-07 ENCOUNTER — RX RENEWAL (OUTPATIENT)
Age: 36
End: 2021-09-07

## 2021-10-04 ENCOUNTER — RX RENEWAL (OUTPATIENT)
Age: 36
End: 2021-10-04

## 2021-10-11 ENCOUNTER — APPOINTMENT (OUTPATIENT)
Dept: ENDOCRINOLOGY | Facility: CLINIC | Age: 36
End: 2021-10-11

## 2021-12-03 ENCOUNTER — APPOINTMENT (OUTPATIENT)
Dept: ENDOCRINOLOGY | Facility: CLINIC | Age: 36
End: 2021-12-03
Payer: COMMERCIAL

## 2021-12-03 ENCOUNTER — RESULT CHARGE (OUTPATIENT)
Age: 36
End: 2021-12-03

## 2021-12-03 VITALS
DIASTOLIC BLOOD PRESSURE: 99 MMHG | WEIGHT: 189 LBS | SYSTOLIC BLOOD PRESSURE: 156 MMHG | HEIGHT: 64 IN | OXYGEN SATURATION: 98 % | BODY MASS INDEX: 32.27 KG/M2 | HEART RATE: 106 BPM

## 2021-12-03 PROCEDURE — 99214 OFFICE O/P EST MOD 30 MIN: CPT

## 2021-12-05 NOTE — HISTORY OF PRESENT ILLNESS
[FreeTextEntry1] : Ms. JACQUES DIAZ  is a 36 year old female with past medical history of Diabetes Mellitus Type 2, Chronic back pain s/p surgery and chronic steroid injections who presents for management of her diabetes. Patient is still receiving steroid injections. She will miss doses of insulin but prefers it over pump. She has not been using sensors and periodically checks her sugars\par \par \par POCT Glucose: 302 mg/dL\par POCT Hga1c: %\par \par \par Diabetes first diagnosed: at age 20\par Type: 2 \par \par Current diabetic regimen:\par Basaglar 35 units QHS\par Novolog 35 units TID\par Metformin 1000 mg BID\par Ozempic 0.5 mg Qweekly\par Other relevant medications:\par \par Self monitoring blood glucose : Patient is using glucose meter\par \par SMBG:\par fastins\par post-meals:200s\par \par \par Hypoglycemia:none\par \par \par Diet: \par Breakfast: skips mostly\par Lunch: rice and chicken or beef, cereal\par Dinner: rice and meat or fish, eggplants, string bean\par Snacks: chips, or crackers, banana, apples\par \par Exercise:\par \par Urine micro: 55 mg/g (2021),  55 mg/g (2020)\par lipid profile: LDL 65 (2021), 118 (2020)\par last hba1c: 10.2% (2021) 11.8% (3/2021), 7.9% (2020), 12.8% (2020)\par eye exam: 2020\par diabetic foot exam/podiatry: in office (2020)\par \par \par \par \par

## 2021-12-05 NOTE — ASSESSMENT
[FreeTextEntry1] : 36 year old female with past medical history of Diabetes Mellitus Type 2, Chronic back pain s/p surgery and chronic steroid injections who presents for management of her diabetes\par \par 1. DM 2- uncontrolled, uncomplicated\par Patient counseled on the importance of diabetic control and complications. Patient's diet and the necessary changes discussed. Reviewed over glycemic goals. I encouraged patient to use Dexcom again and to take her insulins timely. She can control her sugar with proper diet, timing of insulin and monitoring.\par \par Cont Basaglar 35 units QHS\par Cont Novolog 35 units TID\par Cont Metformin 1000 mg BID\par Cont Ozempic 0.5 mg Qweekly\par Check fsg QID\par Cont diabetic diet\par Cont exercise\par Labs today\par Opthalmology Visit up to date\par Foot Exam up to date\par \par 2. HLD- elevated\par Cont Atorvastatin 20 mg QD\par \par \par \par \par

## 2021-12-15 LAB
ALBUMIN SERPL ELPH-MCNC: 4.5 G/DL
ALP BLD-CCNC: 74 U/L
ALT SERPL-CCNC: 23 U/L
ANION GAP SERPL CALC-SCNC: 19 MMOL/L
AST SERPL-CCNC: 20 U/L
BASOPHILS # BLD AUTO: 0.06 K/UL
BASOPHILS NFR BLD AUTO: 0.8 %
BILIRUB SERPL-MCNC: 0.4 MG/DL
BUN SERPL-MCNC: 16 MG/DL
CALCIUM SERPL-MCNC: 9.3 MG/DL
CHLORIDE SERPL-SCNC: 96 MMOL/L
CHOLEST SERPL-MCNC: 201 MG/DL
CO2 SERPL-SCNC: 18 MMOL/L
CREAT SERPL-MCNC: 0.37 MG/DL
CREAT SPEC-SCNC: 31 MG/DL
EOSINOPHIL # BLD AUTO: 0.15 K/UL
EOSINOPHIL NFR BLD AUTO: 1.9 %
ESTIMATED AVERAGE GLUCOSE: 266 MG/DL
FRUCTOSAMINE SERPL-MCNC: 368 UMOL/L
GLUCOSE SERPL-MCNC: 280 MG/DL
HBA1C MFR BLD HPLC: 10.9 %
HCT VFR BLD CALC: 45.4 %
HDLC SERPL-MCNC: 50 MG/DL
HGB BLD-MCNC: 15.3 G/DL
IMM GRANULOCYTES NFR BLD AUTO: 0.1 %
LDLC SERPL CALC-MCNC: 99 MG/DL
LYMPHOCYTES # BLD AUTO: 3.67 K/UL
LYMPHOCYTES NFR BLD AUTO: 46.4 %
MAN DIFF?: NORMAL
MCHC RBC-ENTMCNC: 29.4 PG
MCHC RBC-ENTMCNC: 33.7 GM/DL
MCV RBC AUTO: 87.3 FL
MICROALBUMIN 24H UR DL<=1MG/L-MCNC: 1.7 MG/DL
MICROALBUMIN/CREAT 24H UR-RTO: 54 MG/G
MONOCYTES # BLD AUTO: 0.55 K/UL
MONOCYTES NFR BLD AUTO: 7 %
NEUTROPHILS # BLD AUTO: 3.47 K/UL
NEUTROPHILS NFR BLD AUTO: 43.8 %
NONHDLC SERPL-MCNC: 151 MG/DL
PLATELET # BLD AUTO: 295 K/UL
POTASSIUM SERPL-SCNC: 4.1 MMOL/L
PROT SERPL-MCNC: 7.7 G/DL
RBC # BLD: 5.2 M/UL
RBC # FLD: 12 %
SODIUM SERPL-SCNC: 133 MMOL/L
TRIGL SERPL-MCNC: 260 MG/DL
WBC # FLD AUTO: 7.91 K/UL

## 2021-12-16 ENCOUNTER — NON-APPOINTMENT (OUTPATIENT)
Age: 36
End: 2021-12-16

## 2022-01-31 ENCOUNTER — RX RENEWAL (OUTPATIENT)
Age: 37
End: 2022-01-31

## 2022-02-07 ENCOUNTER — APPOINTMENT (OUTPATIENT)
Dept: ENDOCRINOLOGY | Facility: CLINIC | Age: 37
End: 2022-02-07
Payer: COMMERCIAL

## 2022-02-07 VITALS — HEART RATE: 111 BPM | SYSTOLIC BLOOD PRESSURE: 136 MMHG | DIASTOLIC BLOOD PRESSURE: 87 MMHG | OXYGEN SATURATION: 99 %

## 2022-02-07 PROCEDURE — 82962 GLUCOSE BLOOD TEST: CPT

## 2022-02-07 PROCEDURE — 99213 OFFICE O/P EST LOW 20 MIN: CPT | Mod: 25

## 2022-02-07 PROCEDURE — 83036 HEMOGLOBIN GLYCOSYLATED A1C: CPT | Mod: QW

## 2022-02-07 NOTE — ASSESSMENT
[FreeTextEntry1] : 36 year old female with past medical history of Diabetes Mellitus Type 2, Chronic back pain s/p surgery and chronic steroid injections who presents for management of her diabetes\par \par 1. DM 2- uncontrolled, uncomplicated\par Patient counseled on the importance of diabetic control and complications. Patient's diet and the necessary changes discussed. Reviewed over glycemic goals. I encouraged patient to use Dexcom again and to take her insulins timely. She can control her sugar with proper diet, timing of insulin and monitoring.\par \par Cont Basaglar 35 units switch to AM\par Cont Novolog 35 units TID\par Cont Metformin 1000 mg BID\par Cont Ozempic 0.5 mg Qweekly\par Check fsg QID\par Cont diabetic diet\par Cont exercise\par Labs today\par Opthalmology Visit up to date\par Foot Exam up to date\par \par 2. HLD- elevated\par Cont Atorvastatin 20 mg QD\par \par \par \par \par

## 2022-02-07 NOTE — HISTORY OF PRESENT ILLNESS
[FreeTextEntry1] : Ms. JACQUES DIAZ  is a 36 year old female with past medical history of Diabetes Mellitus Type 2, Chronic back pain s/p surgery and chronic steroid injections who presents for management of her diabetes. Patient is not on Dexcom because she forgets and is admitting to missing insulin doses.\par \par \par POCT Glucose: 291 mg/dL\par POCT Hga1c: 11.8%\par \par \par Diabetes first diagnosed: at age 20\par Type: 2 \par \par Current diabetic regimen:\par Basaglar 35 units QHS\par Novolog 35 units TID\par Metformin 1000 mg BID\par Ozempic 0.5 mg Qweekly\par Other relevant medications:\par \par Self monitoring blood glucose : Patient is using glucose meter\par \par SMBG:\par fastins\par post-meals:200-300s\par \par \par Hypoglycemia:none\par \par \par Diet: \par Breakfast: skips mostly\par Lunch: rice and chicken or beef, cereal\par Dinner: rice and meat or fish, eggplants, string bean\par Snacks: chips, or crackers, banana, apples\par \par Exercise:\par \par Urine micro: 54 (2021), 55 mg/g (2021),  55 mg/g (2020)\par lipid profile: LDL 99 (2021), LDL 65 (2021), 118 (2020)\par last hba1c:11.8% (2022), 10.2% (2021) 11.8% (3/2021), 7.9% (2020), 12.8% (2020)\par eye exam: 2020\par diabetic foot exam/podiatry: in office (2020)\par \par \par \par \par

## 2022-02-27 ENCOUNTER — RX RENEWAL (OUTPATIENT)
Age: 37
End: 2022-02-27

## 2022-03-10 ENCOUNTER — APPOINTMENT (OUTPATIENT)
Dept: ENDOCRINOLOGY | Facility: CLINIC | Age: 37
End: 2022-03-10

## 2022-03-14 ENCOUNTER — RX RENEWAL (OUTPATIENT)
Age: 37
End: 2022-03-14

## 2022-04-28 ENCOUNTER — RX RENEWAL (OUTPATIENT)
Age: 37
End: 2022-04-28

## 2022-05-05 ENCOUNTER — TRANSCRIPTION ENCOUNTER (OUTPATIENT)
Age: 37
End: 2022-05-05

## 2022-05-11 ENCOUNTER — TRANSCRIPTION ENCOUNTER (OUTPATIENT)
Age: 37
End: 2022-05-11

## 2022-05-12 ENCOUNTER — TRANSCRIPTION ENCOUNTER (OUTPATIENT)
Age: 37
End: 2022-05-12

## 2022-06-05 ENCOUNTER — RX RENEWAL (OUTPATIENT)
Age: 37
End: 2022-06-05

## 2022-06-10 ENCOUNTER — RX RENEWAL (OUTPATIENT)
Age: 37
End: 2022-06-10

## 2022-06-28 ENCOUNTER — APPOINTMENT (OUTPATIENT)
Dept: ENDOCRINOLOGY | Facility: CLINIC | Age: 37
End: 2022-06-28
Payer: COMMERCIAL

## 2022-06-28 VITALS
DIASTOLIC BLOOD PRESSURE: 87 MMHG | BODY MASS INDEX: 32.27 KG/M2 | HEART RATE: 90 BPM | SYSTOLIC BLOOD PRESSURE: 136 MMHG | HEIGHT: 64 IN | OXYGEN SATURATION: 99 % | WEIGHT: 189 LBS

## 2022-06-28 PROCEDURE — 83036 HEMOGLOBIN GLYCOSYLATED A1C: CPT | Mod: QW

## 2022-06-28 PROCEDURE — 99214 OFFICE O/P EST MOD 30 MIN: CPT | Mod: 25

## 2022-06-28 PROCEDURE — 82962 GLUCOSE BLOOD TEST: CPT

## 2022-06-28 RX ORDER — CYCLOBENZAPRINE HYDROCHLORIDE 5 MG/1
5 TABLET, FILM COATED ORAL 3 TIMES DAILY
Qty: 90 | Refills: 1 | Status: DISCONTINUED | COMMUNITY
Start: 2018-07-10 | End: 2022-06-28

## 2022-06-28 RX ORDER — DULAGLUTIDE 0.75 MG/.5ML
0.75 INJECTION, SOLUTION SUBCUTANEOUS
Qty: 3 | Refills: 0 | Status: DISCONTINUED | COMMUNITY
Start: 2021-03-02 | End: 2022-06-28

## 2022-06-28 RX ORDER — INSULIN ASPART 100 [IU]/ML
100 INJECTION, SOLUTION INTRAVENOUS; SUBCUTANEOUS
Qty: 150 | Refills: 2 | Status: DISCONTINUED | COMMUNITY
Start: 2021-06-02 | End: 2022-06-28

## 2022-06-28 RX ORDER — PEN NEEDLE, DIABETIC 29 G X1/2"
32G X 4 MM NEEDLE, DISPOSABLE MISCELLANEOUS
Qty: 400 | Refills: 1 | Status: DISCONTINUED | COMMUNITY
Start: 2018-02-08 | End: 2022-06-28

## 2022-06-28 RX ORDER — CYCLOBENZAPRINE HYDROCHLORIDE 10 MG/1
10 TABLET, FILM COATED ORAL 3 TIMES DAILY
Qty: 90 | Refills: 0 | Status: DISCONTINUED | COMMUNITY
Start: 2018-03-27 | End: 2022-06-28

## 2022-06-28 RX ORDER — CYCLOBENZAPRINE HYDROCHLORIDE 5 MG/1
5 TABLET, FILM COATED ORAL
Qty: 30 | Refills: 1 | Status: DISCONTINUED | COMMUNITY
Start: 2017-11-02 | End: 2022-06-28

## 2022-06-28 NOTE — HISTORY OF PRESENT ILLNESS
[FreeTextEntry1] : Ms. JACQUES DIAZ  is a 36 year old female with past medical history of Diabetes Mellitus Type 2, Chronic back pain s/p surgery and chronic steroid injections who presents for management of her diabetes.\par \par \par POCT Glucose: 181 mg/dL\par POCT Hga1c: 10.2%\par \par \par Diabetes first diagnosed: at age 20\par Type: 2 \par \par Current diabetic regimen:\par Basaglar 35 units QAM\par Novolog 35 units TID\par Metformin 1000 mg BID\par Ozempic 0.5 mg Qweekly\par Other relevant medications:\par \par Self monitoring blood glucose : Patient is using glucose meter\par \par SMBG:\par fastins\par post-meals:200-300s\par \par \par Hypoglycemia:none\par \par \par Diet: \par Breakfast: skips mostly\par Lunch: rice and chicken or beef, cereal\par Dinner: rice and meat or fish, eggplants, string bean\par Snacks: chips, or crackers, banana, apples\par \par Exercise:\par \par Urine micro: 54 (2021), 55 mg/g (2021),  55 mg/g (2020)\par lipid profile: LDL 99 (2021), LDL 65 (2021), 118 (2020)\par last hba1c:10.2% (2022), 11.8% (2022), 10.2% (2021) 11.8% (3/2021), 7.9% (2020), 12.8% (2020)\par eye exam: 2020\par diabetic foot exam/podiatry: in office (2020)\par \par \par \par \par

## 2022-06-28 NOTE — ASSESSMENT
[FreeTextEntry1] : 36 year old female with past medical history of Diabetes Mellitus Type 2, Chronic back pain s/p surgery and chronic steroid injections who presents for management of her diabetes\par \par 1. DM 2- uncontrolled, uncomplicated\par Patient counseled on the importance of diabetic control and complications. Patient's diet and the necessary changes discussed. Reviewed over glycemic goals. I encouraged patient to use Dexcom again and to take her insulins timely. She can control her sugar with proper diet, timing of insulin and monitoring.\par \par Cont Basaglar 35 units switch to AM\par Cont Novolog 35 units TID\par Cont Metformin 1000 mg BID\par Inc to Ozempic 1 mg Qweekly\par Check fsg QID\par Cont diabetic diet\par Cont exercise\par Labs today\par Opthalmology Visit up to date\par Foot Exam up to date\par \par 2. HLD- elevated\par Cont Atorvastatin 20 mg QD\par \par \par \par \par

## 2022-06-28 NOTE — PHYSICAL EXAM
[Right foot was examined, including] : right foot ~C was examined, including visual inspection with sensory and pulse exams [Left foot was examined, including] : left foot ~C was examined, including visual inspection with sensory and pulse exams [Normal] : normal [Full ROM] : with full range of motion [Alert] : alert [Well Nourished] : well nourished [No Acute Distress] : no acute distress [Well Developed] : well developed [Normal Sclera/Conjunctiva] : normal sclera/conjunctiva [EOMI] : extra ocular movement intact [No Proptosis] : no proptosis [Normal Oropharynx] : the oropharynx was normal [Thyroid Not Enlarged] : the thyroid was not enlarged [No Thyroid Nodules] : no palpable thyroid nodules [No Edema] : no peripheral edema [Pedal Pulses Normal] : the pedal pulses are present [Normal Bowel Sounds] : normal bowel sounds [Not Tender] : non-tender [Not Distended] : not distended [Soft] : abdomen soft [Normal Anterior Cervical Nodes] : no anterior cervical lymphadenopathy [No Spinal Tenderness] : no spinal tenderness [Spine Straight] : spine straight [No Stigmata of Cushings Syndrome] : no stigmata of Cushings Syndrome [Normal Gait] : normal gait [Normal Strength/Tone] : muscle strength and tone were normal [No Rash] : no rash [Acanthosis Nigricans] : no acanthosis nigricans [Diminished Throughout Both Feet] : normal tactile sensation with monofilament testing throughout both feet [Normal Reflexes] : deep tendon reflexes were 2+ and symmetric [No Tremors] : no tremors [Oriented x3] : oriented to person, place, and time

## 2022-08-08 ENCOUNTER — RX RENEWAL (OUTPATIENT)
Age: 37
End: 2022-08-08

## 2022-08-09 ENCOUNTER — RX RENEWAL (OUTPATIENT)
Age: 37
End: 2022-08-09

## 2022-08-11 ENCOUNTER — RX RENEWAL (OUTPATIENT)
Age: 37
End: 2022-08-11

## 2022-08-12 ENCOUNTER — NON-APPOINTMENT (OUTPATIENT)
Age: 37
End: 2022-08-12

## 2022-08-12 NOTE — ED PROVIDER NOTE - RESPIRATORY, MLM
Breath sounds clear and equal bilaterally. Erythromycin Pregnancy And Lactation Text: This medication is Pregnancy Category B and is considered safe during pregnancy. It is also excreted in breast milk.

## 2022-08-15 ENCOUNTER — EMERGENCY (EMERGENCY)
Facility: HOSPITAL | Age: 37
LOS: 1 days | Discharge: HOME | End: 2022-08-15
Attending: STUDENT IN AN ORGANIZED HEALTH CARE EDUCATION/TRAINING PROGRAM
Payer: COMMERCIAL

## 2022-08-15 VITALS
RESPIRATION RATE: 16 BRPM | OXYGEN SATURATION: 100 % | HEART RATE: 96 BPM | HEIGHT: 64 IN | SYSTOLIC BLOOD PRESSURE: 155 MMHG | WEIGHT: 177.91 LBS | DIASTOLIC BLOOD PRESSURE: 96 MMHG | TEMPERATURE: 98 F

## 2022-08-15 DIAGNOSIS — Z90.49 ACQUIRED ABSENCE OF OTHER SPECIFIED PARTS OF DIGESTIVE TRACT: Chronic | ICD-10-CM

## 2022-08-15 LAB
ALBUMIN SERPL ELPH-MCNC: 4 G/DL — SIGNIFICANT CHANGE UP (ref 3.3–5.2)
ALP SERPL-CCNC: 60 U/L — SIGNIFICANT CHANGE UP (ref 40–120)
ALT FLD-CCNC: 20 U/L — SIGNIFICANT CHANGE UP
ANION GAP SERPL CALC-SCNC: 12 MMOL/L — SIGNIFICANT CHANGE UP (ref 5–17)
APPEARANCE UR: ABNORMAL
AST SERPL-CCNC: 17 U/L — SIGNIFICANT CHANGE UP
BACTERIA # UR AUTO: ABNORMAL
BASOPHILS # BLD AUTO: 0.05 K/UL — SIGNIFICANT CHANGE UP (ref 0–0.2)
BASOPHILS NFR BLD AUTO: 0.5 % — SIGNIFICANT CHANGE UP (ref 0–2)
BILIRUB SERPL-MCNC: 0.3 MG/DL — LOW (ref 0.4–2)
BILIRUB UR-MCNC: NEGATIVE — SIGNIFICANT CHANGE UP
BUN SERPL-MCNC: 10.6 MG/DL — SIGNIFICANT CHANGE UP (ref 8–20)
CALCIUM SERPL-MCNC: 9 MG/DL — SIGNIFICANT CHANGE UP (ref 8.4–10.5)
CHLORIDE SERPL-SCNC: 98 MMOL/L — SIGNIFICANT CHANGE UP (ref 98–107)
CO2 SERPL-SCNC: 24 MMOL/L — SIGNIFICANT CHANGE UP (ref 22–29)
COLOR SPEC: YELLOW — SIGNIFICANT CHANGE UP
CREAT SERPL-MCNC: 0.39 MG/DL — LOW (ref 0.5–1.3)
DIFF PNL FLD: ABNORMAL
EGFR: 134 ML/MIN/1.73M2 — SIGNIFICANT CHANGE UP
EOSINOPHIL # BLD AUTO: 0.13 K/UL — SIGNIFICANT CHANGE UP (ref 0–0.5)
EOSINOPHIL NFR BLD AUTO: 1.3 % — SIGNIFICANT CHANGE UP (ref 0–6)
EPI CELLS # UR: SIGNIFICANT CHANGE UP
GLUCOSE SERPL-MCNC: 244 MG/DL — HIGH (ref 70–99)
GLUCOSE UR QL: 250 MG/DL
HCG SERPL-ACNC: <4 MIU/ML — SIGNIFICANT CHANGE UP
HCT VFR BLD CALC: 41.3 % — SIGNIFICANT CHANGE UP (ref 34.5–45)
HGB BLD-MCNC: 14.5 G/DL — SIGNIFICANT CHANGE UP (ref 11.5–15.5)
HIV 1 & 2 AB SERPL IA.RAPID: SIGNIFICANT CHANGE UP
IMM GRANULOCYTES NFR BLD AUTO: 0.4 % — SIGNIFICANT CHANGE UP (ref 0–1.5)
KETONES UR-MCNC: NEGATIVE — SIGNIFICANT CHANGE UP
LEUKOCYTE ESTERASE UR-ACNC: ABNORMAL
LIDOCAIN IGE QN: 42 U/L — SIGNIFICANT CHANGE UP (ref 22–51)
LYMPHOCYTES # BLD AUTO: 3.76 K/UL — HIGH (ref 1–3.3)
LYMPHOCYTES # BLD AUTO: 36.5 % — SIGNIFICANT CHANGE UP (ref 13–44)
MCHC RBC-ENTMCNC: 30.8 PG — SIGNIFICANT CHANGE UP (ref 27–34)
MCHC RBC-ENTMCNC: 35.1 GM/DL — SIGNIFICANT CHANGE UP (ref 32–36)
MCV RBC AUTO: 87.7 FL — SIGNIFICANT CHANGE UP (ref 80–100)
MONOCYTES # BLD AUTO: 0.76 K/UL — SIGNIFICANT CHANGE UP (ref 0–0.9)
MONOCYTES NFR BLD AUTO: 7.4 % — SIGNIFICANT CHANGE UP (ref 2–14)
NEUTROPHILS # BLD AUTO: 5.57 K/UL — SIGNIFICANT CHANGE UP (ref 1.8–7.4)
NEUTROPHILS NFR BLD AUTO: 53.9 % — SIGNIFICANT CHANGE UP (ref 43–77)
NITRITE UR-MCNC: NEGATIVE — SIGNIFICANT CHANGE UP
PH UR: 6 — SIGNIFICANT CHANGE UP (ref 5–8)
PLATELET # BLD AUTO: 275 K/UL — SIGNIFICANT CHANGE UP (ref 150–400)
POTASSIUM SERPL-MCNC: 4.1 MMOL/L — SIGNIFICANT CHANGE UP (ref 3.5–5.3)
POTASSIUM SERPL-SCNC: 4.1 MMOL/L — SIGNIFICANT CHANGE UP (ref 3.5–5.3)
PROT SERPL-MCNC: 7.4 G/DL — SIGNIFICANT CHANGE UP (ref 6.6–8.7)
PROT UR-MCNC: 30 MG/DL
RBC # BLD: 4.71 M/UL — SIGNIFICANT CHANGE UP (ref 3.8–5.2)
RBC # FLD: 11.9 % — SIGNIFICANT CHANGE UP (ref 10.3–14.5)
RBC CASTS # UR COMP ASSIST: SIGNIFICANT CHANGE UP /HPF (ref 0–4)
SARS-COV-2 RNA SPEC QL NAA+PROBE: SIGNIFICANT CHANGE UP
SODIUM SERPL-SCNC: 134 MMOL/L — LOW (ref 135–145)
SP GR SPEC: 1.01 — SIGNIFICANT CHANGE UP (ref 1.01–1.02)
TROPONIN T SERPL-MCNC: <0.01 NG/ML — SIGNIFICANT CHANGE UP (ref 0–0.06)
UROBILINOGEN FLD QL: NEGATIVE MG/DL — SIGNIFICANT CHANGE UP
WBC # BLD: 10.31 K/UL — SIGNIFICANT CHANGE UP (ref 3.8–10.5)
WBC # FLD AUTO: 10.31 K/UL — SIGNIFICANT CHANGE UP (ref 3.8–10.5)
WBC UR QL: SIGNIFICANT CHANGE UP /HPF (ref 0–5)

## 2022-08-15 PROCEDURE — 80053 COMPREHEN METABOLIC PANEL: CPT

## 2022-08-15 PROCEDURE — 71046 X-RAY EXAM CHEST 2 VIEWS: CPT | Mod: 26

## 2022-08-15 PROCEDURE — 76705 ECHO EXAM OF ABDOMEN: CPT

## 2022-08-15 PROCEDURE — 84702 CHORIONIC GONADOTROPIN TEST: CPT

## 2022-08-15 PROCEDURE — 83690 ASSAY OF LIPASE: CPT

## 2022-08-15 PROCEDURE — 71046 X-RAY EXAM CHEST 2 VIEWS: CPT

## 2022-08-15 PROCEDURE — 99285 EMERGENCY DEPT VISIT HI MDM: CPT

## 2022-08-15 PROCEDURE — 96375 TX/PRO/DX INJ NEW DRUG ADDON: CPT

## 2022-08-15 PROCEDURE — 93005 ELECTROCARDIOGRAM TRACING: CPT

## 2022-08-15 PROCEDURE — 86703 HIV-1/HIV-2 1 RESULT ANTBDY: CPT

## 2022-08-15 PROCEDURE — U0003: CPT

## 2022-08-15 PROCEDURE — 36415 COLL VENOUS BLD VENIPUNCTURE: CPT

## 2022-08-15 PROCEDURE — 93010 ELECTROCARDIOGRAM REPORT: CPT

## 2022-08-15 PROCEDURE — 96374 THER/PROPH/DIAG INJ IV PUSH: CPT

## 2022-08-15 PROCEDURE — 87086 URINE CULTURE/COLONY COUNT: CPT

## 2022-08-15 PROCEDURE — U0005: CPT

## 2022-08-15 PROCEDURE — 81001 URINALYSIS AUTO W/SCOPE: CPT

## 2022-08-15 PROCEDURE — 85025 COMPLETE CBC W/AUTO DIFF WBC: CPT

## 2022-08-15 PROCEDURE — 84484 ASSAY OF TROPONIN QUANT: CPT

## 2022-08-15 PROCEDURE — 99285 EMERGENCY DEPT VISIT HI MDM: CPT | Mod: 25

## 2022-08-15 PROCEDURE — 76705 ECHO EXAM OF ABDOMEN: CPT | Mod: 26

## 2022-08-15 RX ORDER — IBUPROFEN 200 MG
1 TABLET ORAL
Qty: 42 | Refills: 0
Start: 2022-08-15 | End: 2022-08-21

## 2022-08-15 RX ORDER — ACETAMINOPHEN 500 MG
2 TABLET ORAL
Qty: 56 | Refills: 0
Start: 2022-08-15 | End: 2022-08-21

## 2022-08-15 RX ORDER — ACETAMINOPHEN 500 MG
1000 TABLET ORAL ONCE
Refills: 0 | Status: COMPLETED | OUTPATIENT
Start: 2022-08-15 | End: 2022-08-15

## 2022-08-15 RX ORDER — ONDANSETRON 8 MG/1
4 TABLET, FILM COATED ORAL ONCE
Refills: 0 | Status: COMPLETED | OUTPATIENT
Start: 2022-08-15 | End: 2022-08-15

## 2022-08-15 RX ORDER — ONDANSETRON 8 MG/1
1 TABLET, FILM COATED ORAL
Qty: 21 | Refills: 0
Start: 2022-08-15 | End: 2022-08-21

## 2022-08-15 RX ORDER — SODIUM CHLORIDE 9 MG/ML
1000 INJECTION INTRAMUSCULAR; INTRAVENOUS; SUBCUTANEOUS ONCE
Refills: 0 | Status: COMPLETED | OUTPATIENT
Start: 2022-08-15 | End: 2022-08-15

## 2022-08-15 RX ADMIN — ONDANSETRON 4 MILLIGRAM(S): 8 TABLET, FILM COATED ORAL at 08:35

## 2022-08-15 RX ADMIN — SODIUM CHLORIDE 1000 MILLILITER(S): 9 INJECTION INTRAMUSCULAR; INTRAVENOUS; SUBCUTANEOUS at 09:30

## 2022-08-15 RX ADMIN — SODIUM CHLORIDE 1000 MILLILITER(S): 9 INJECTION INTRAMUSCULAR; INTRAVENOUS; SUBCUTANEOUS at 08:35

## 2022-08-15 RX ADMIN — Medication 400 MILLIGRAM(S): at 08:35

## 2022-08-15 RX ADMIN — Medication 1000 MILLIGRAM(S): at 08:50

## 2022-08-15 NOTE — ED PROVIDER NOTE - NSFOLLOWUPINSTRUCTIONS_ED_ALL_ED_FT
Viral Illness, Adult    Viruses are tiny germs that can get into a person's body and cause illness. There are many different types of viruses, and they cause many types of illness. Viral illnesses can range from mild to severe. They can affect various parts of the body.    Common illnesses that are caused by a virus include colds and the flu (influenza). Viral illnesses also include serious conditions, such as HIV (human immunodeficiency virus) infection and AIDS (acquired immunodeficiency syndrome). A few viruses have been linked to certain cancers.    What are the causes?  Many types of viruses can cause illness. Viruses invade cells in your body, multiply, and cause the infected cells to work abnormally or die. When these cells die, they release more of the virus. When this happens, you develop symptoms of the illness, and the virus continues to spread to other cells. If the virus takes over the function of the cell, it can cause the cell to divide and grow out of control. This happens when a virus causes cancer.    Different viruses get into the body in different ways. You can get a virus by:    Swallowing food or water that has come in contact with the virus (is contaminated).  Breathing in droplets that have been coughed or sneezed into the air by an infected person.  Touching a surface that has been contaminated with the virus and then touching your eyes, nose, or mouth.  Being bitten by an insect or animal that carries the virus.  Having sexual contact with a person who is infected with the virus.  Being exposed to blood or fluids that contain the virus, either through an open cut or during a transfusion.    If a virus enters your body, your body's defense system (immune system) will try to fight the virus. You may be at higher risk for a viral illness if your immune system is weak.    What are the signs or symptoms?  You may have these symptoms, depending on the type of virus and the location of the cells that it invades:    Cold and flu viruses:    Fever.  Headache.  Sore throat.  Muscle aches.  Stuffy nose (nasal congestion).  Cough.  Digestive system (gastrointestinal) viruses:    Fever.  Pain in the abdomen.  Nausea.  Diarrhea.  Liver viruses (hepatitis):    Loss of appetite.  Tiredness.  Skin or the white parts of your eyes turning yellow (jaundice).  Brain and spinal cord viruses:    Fever.  Headache.  Stiff neck.  Nausea and vomiting.  Confusion or sleepiness.  Skin viruses:    Warts.  Itching.  Rash.  Sexually transmitted viruses:    Discharge.  Swelling.  Redness.  Rash.    How is this diagnosed?  This condition may be diagnosed based on one or more of the following:    Symptoms.  Medical history.  Physical exam.  Blood test, sample of mucus from your lungs (sputum sample), stool sample, or a swab of body fluids or a skin sore (lesion).    How is this treated?  Viruses can be hard to treat because they live within cells. Antibiotic medicines do not treat viruses because these medicines do not get inside cells. Treatment for a viral illness may include:    Resting and drinking plenty of fluids.  Medicines to relieve symptoms. These can include over-the-counter medicine for pain and fever, medicines for cough or congestion, and medicines to relieve diarrhea.  Antiviral medicines. These medicines are available only for certain types of viruses. They may help reduce flu symptoms if taken early in the infection. There are also many antiviral medicines for hepatitis and for HIV and AIDS.    Some viral illnesses can be prevented with vaccinations. A common example is the flu shot.    Follow these instructions at home:      Medicines    Take over-the-counter and prescription medicines only as told by your health care provider.  If you were prescribed an antiviral medicine, take it as told by your health care provider. Do not stop taking the antiviral even if you start to feel better.  Be aware of when antibiotics are needed and when they are not needed. Antibiotics do not treat viruses. You may get an antibiotic if your health care provider thinks that you may have, or are at risk for, a bacterial infection and you have a viral infection.    Do not ask for an antibiotic prescription if you have been diagnosed with a viral illness. Antibiotics will not make your illness go away faster.  Frequently taking antibiotics when they are not needed can lead to antibiotic resistance. When this develops, the medicine no longer works against the bacteria that it normally fights.        General instructions     Drink enough fluids to keep your urine pale yellow.  Rest as much as possible.  Return to your normal activities as told by your health care provider. Ask your health care provider what activities are safe for you.  Keep all follow-up visits as told by your health care provider. This is important.    How is this prevented?     To reduce your risk of viral illness:    Wash your hands often with soap and water for at least 20 seconds. If soap and water are not available, use hand .  Avoid touching your nose, eyes, and mouth, especially if you have not washed your hands recently.  If anyone in your household has a viral infection, clean all household surfaces that may have been in contact with the virus. Use soap and hot water. You may also use bleach that you have added water to (diluted).  Stay away from people who are sick with symptoms of a viral infection.  Do not share items such as toothbrushes and water bottles with other people.  Keep your vaccinations up to date. This includes getting a yearly flu shot.  Eat a healthy diet and get plenty of rest.    Contact a health care provider if:  You have symptoms of a viral illness that do not go away.  Your symptoms come back after going away.  Your symptoms get worse.    Get help right away if you have:  Trouble breathing.  A severe headache or a stiff neck.  Severe vomiting or pain in your abdomen.    These symptoms may represent a serious problem that is an emergency. Do not wait to see if the symptoms will go away. Get medical help right away. Call your local emergency services (911 in the [place] Do not drive yourself to the hospital.    Summary  Viruses are types of germs that can get into a person's body and cause illness. Viral illnesses can range from mild to severe. They can affect various parts of the body.  Viruses can be hard to treat. There are medicines to relieve symptoms, and there are some antiviral medicines.  If you were prescribed an antiviral medicine, take it as told by your health care provider. Do not stop taking the antiviral even if you start to feel better.  Contact a health care provider if you have symptoms of a viral illness that do not go away.    ADDITIONAL NOTES AND INSTRUCTIONS    Please follow up with your Primary MD in 24-48 hr.  Seek immediate medical care for any new/worsening signs or symptoms.

## 2022-08-15 NOTE — ED ADULT NURSE NOTE - OBJECTIVE STATEMENT
Pt c/o abdominal pain w/ nausea x 4 days, also c/o chest pain x 3 weeks, hx of DM and HLD, AOx3, resp even and unlabored, denies fever/chills, vaccinated for covid

## 2022-08-15 NOTE — ED PROVIDER NOTE - PATIENT PORTAL LINK FT
You can access the FollowMyHealth Patient Portal offered by [facility] by registering at the following website: [email or web address]. By joining [facility]’s FollowMyHealth portal, you will also be able to view your health information using other applications (apps) compatible with our system.

## 2022-08-15 NOTE — ED PROVIDER NOTE - CLINICAL SUMMARY MEDICAL DECISION MAKING FREE TEXT BOX
36yoF presents with intermittent left chest pain with no other associated symptoms x 3 weeks as well as RUQ pain x 4 days with tenderness to palpation in RUQ and associated nausea. Concern for cholecystitis. Low suspicion for ACS or pulm pathology. EKG without ischemic changes. Blood work, urine, cxr, ruq ultrasound, pain medication, fluids.

## 2022-08-15 NOTE — ED ADULT TRIAGE NOTE - CHIEF COMPLAINT QUOTE
Patient ambulated into ED c/o RUQ pain x4 days + generalized left sided chest pain x3 weeks. Denies N/V/D or SOB. Hx of DM. EKG completed.

## 2022-08-15 NOTE — ED PROVIDER NOTE - OBJECTIVE STATEMENT
37yo woman PMH IDDM, back surgery presents with intermittent generalized left chest pain x 3 weeks and RUQ pain x 4 days with associated nausea but no vomiting. Pt denies fever, cough, vomiting, diarrhea, dysuria. No SOB. PT was seen by urgent care yesterday and told to come to the ED. Normal EKG at urgent care.

## 2022-08-15 NOTE — ED PROVIDER NOTE - PHYSICAL EXAMINATION
General: well-appearing woman in no acute distress  Head: normocephalic, atraumatic  Eyes: clear eyes  Mouth: moist mucous membranes  Neck: supple neck  CV: normal rate and rhythm, no LE edema, peripheral pulses 2+ bilateral UE and LE  Respiratory: clear to auscultation bilaterally  Abdomen: soft, nondistended, tender to palpation of RUQ  : no suprapubic tenderness, no CVAT  MSK: no joint deformities  Neuro: alert and oriented x3, speech clear, moving all extremities spontaneously without difficulty  Skin: no rash noted

## 2022-08-15 NOTE — ED PROVIDER NOTE - PROGRESS NOTE DETAILS
Richie: pt reports improvement in nausea, abdominal pain. still pending u/s. will follow up. Richie: pt reports improvement in nausea, abdominal pain. still pending u/s. will follow up. CXR w/o consolidation, pnuemo, cardiac silhouette. Richie: Informed pt u/s was negative, chest work-up negative. Pt reassured. Will send Zofran, analgesics. Return precautions given. Pt amenable to discharge at this time. Richie: pt reports improvement in nausea, abdominal pain. still pending u/s. will follow up. CXR w/o consolidation, pneumo, cardiac silhouette.

## 2022-08-15 NOTE — ED PROVIDER NOTE - NS ED ROS FT
General: no fever  Head: no headache  Eyes: no vision change  ENT: no nasal discharge/congestion  CV: +chest pain  Resp: no SOB, no cough  GI: +nausea, no V/D, +abdominal pain  : no dysuria  MSK: no joint pain  Skin: no new rash  Neuro: no focal weakness, no change in sensation

## 2022-08-15 NOTE — ED ADULT TRIAGE NOTE - NSSEPSISSUSPECTED_ED_A_ED
Writer went to give pt benadryl and pt become verbally upset due to nobody taking him serious. Pt states he can take benadryl at home. Writer went to get dr Sandra Braden to further evaluate however pt eloped before dr Sandra Braden was able to get to the room.       Farzad Prabhakar RN  09/06/21 [id number] No

## 2022-08-16 LAB
CULTURE RESULTS: SIGNIFICANT CHANGE UP
SPECIMEN SOURCE: SIGNIFICANT CHANGE UP

## 2022-09-19 ENCOUNTER — RX RENEWAL (OUTPATIENT)
Age: 37
End: 2022-09-19

## 2022-10-05 ENCOUNTER — APPOINTMENT (OUTPATIENT)
Dept: ENDOCRINOLOGY | Facility: CLINIC | Age: 37
End: 2022-10-05

## 2022-10-28 ENCOUNTER — RX RENEWAL (OUTPATIENT)
Age: 37
End: 2022-10-28

## 2022-11-01 NOTE — ED ADULT NURSE NOTE - EXTENSIONS OF SELF_ADULT
- He is following closely with the urologist and will have repeat PSA done in January with an MRI of the prostate if needed at that time   Eyeglasses

## 2022-11-02 ENCOUNTER — RX RENEWAL (OUTPATIENT)
Age: 37
End: 2022-11-02

## 2022-11-17 ENCOUNTER — RX RENEWAL (OUTPATIENT)
Age: 37
End: 2022-11-17

## 2023-01-18 NOTE — END OF VISIT
[Time Spent: ___ minutes] : I have spent [unfilled] minutes of time on the encounter. no pain, swelling or deformity of joints

## 2023-01-24 ENCOUNTER — RX RENEWAL (OUTPATIENT)
Age: 38
End: 2023-01-24

## 2023-02-26 ENCOUNTER — RX RENEWAL (OUTPATIENT)
Age: 38
End: 2023-02-26

## 2023-03-06 ENCOUNTER — RX RENEWAL (OUTPATIENT)
Age: 38
End: 2023-03-06

## 2023-03-29 ENCOUNTER — RX RENEWAL (OUTPATIENT)
Age: 38
End: 2023-03-29

## 2023-04-06 ENCOUNTER — TRANSCRIPTION ENCOUNTER (OUTPATIENT)
Age: 38
End: 2023-04-06

## 2023-04-06 ENCOUNTER — RX RENEWAL (OUTPATIENT)
Age: 38
End: 2023-04-06

## 2023-04-07 ENCOUNTER — RX RENEWAL (OUTPATIENT)
Age: 38
End: 2023-04-07

## 2023-04-26 ENCOUNTER — APPOINTMENT (OUTPATIENT)
Dept: ENDOCRINOLOGY | Facility: CLINIC | Age: 38
End: 2023-04-26
Payer: COMMERCIAL

## 2023-04-26 ENCOUNTER — NON-APPOINTMENT (OUTPATIENT)
Age: 38
End: 2023-04-26

## 2023-04-26 ENCOUNTER — RESULT CHARGE (OUTPATIENT)
Age: 38
End: 2023-04-26

## 2023-04-26 PROCEDURE — 83036 HEMOGLOBIN GLYCOSYLATED A1C: CPT | Mod: QW

## 2023-04-26 PROCEDURE — G0108 DIAB MANAGE TRN  PER INDIV: CPT

## 2023-04-26 RX ORDER — LANCETS 33 GAUGE
EACH MISCELLANEOUS
Qty: 1 | Refills: 2 | Status: ACTIVE | COMMUNITY
Start: 2023-04-26 | End: 1900-01-01

## 2023-04-26 RX ORDER — BLOOD-GLUCOSE METER
KIT MISCELLANEOUS 4 TIMES DAILY
Qty: 1 | Refills: 2 | Status: ACTIVE | COMMUNITY
Start: 2023-04-26 | End: 1900-01-01

## 2023-04-26 RX ORDER — ATORVASTATIN CALCIUM 20 MG/1
20 TABLET, FILM COATED ORAL
Qty: 90 | Refills: 1 | Status: ACTIVE | COMMUNITY
Start: 2020-09-10 | End: 1900-01-01

## 2023-04-26 RX ORDER — BLOOD-GLUCOSE METER
W/DEVICE KIT MISCELLANEOUS
Qty: 1 | Refills: 0 | Status: ACTIVE | COMMUNITY
Start: 2023-04-26 | End: 1900-01-01

## 2023-05-25 RX ORDER — METFORMIN HYDROCHLORIDE 1000 MG/1
1000 TABLET, COATED ORAL
Qty: 180 | Refills: 2 | Status: ACTIVE | COMMUNITY
Start: 2018-02-05 | End: 1900-01-01

## 2023-06-20 NOTE — ED PROVIDER NOTE - NS ED MD DISPO DIVISION
CT scan shows multiple small stones in each kidney.  There is no evidence a passing stone and no evidence of obstruction.  She has some left renal cysts. Urine culture was negative. Has long standing microhematuria.   She is scheduled to follow up me in August. Further discussion then.  
From: Richelle Briscoe  To: Adam Crockett  Sent: 6/19/2023 1:34 PM CDT  Subject: Test results     Would you please contact me about the current test results of 6/14/2023.  
Patient updated.  Confirmed her understanding.   
[facility]

## 2023-08-08 ENCOUNTER — RX RENEWAL (OUTPATIENT)
Age: 38
End: 2023-08-08

## 2023-08-29 ENCOUNTER — RX RENEWAL (OUTPATIENT)
Age: 38
End: 2023-08-29

## 2023-09-06 RX ORDER — PEN NEEDLE, DIABETIC 32GX 5/32"
32G X 4 MM NEEDLE, DISPOSABLE MISCELLANEOUS
Qty: 200 | Refills: 0 | Status: ACTIVE | COMMUNITY
Start: 2020-09-22 | End: 1900-01-01

## 2023-09-06 RX ORDER — LANCETS 28 GAUGE
EACH MISCELLANEOUS
Qty: 2 | Refills: 0 | Status: ACTIVE | COMMUNITY
Start: 2018-02-08 | End: 1900-01-01

## 2023-09-11 ENCOUNTER — APPOINTMENT (OUTPATIENT)
Dept: ENDOCRINOLOGY | Facility: CLINIC | Age: 38
End: 2023-09-11

## 2023-11-22 ENCOUNTER — RX RENEWAL (OUTPATIENT)
Age: 38
End: 2023-11-22

## 2023-12-01 ENCOUNTER — TRANSCRIPTION ENCOUNTER (OUTPATIENT)
Age: 38
End: 2023-12-01

## 2023-12-13 ENCOUNTER — APPOINTMENT (OUTPATIENT)
Dept: ENDOCRINOLOGY | Facility: CLINIC | Age: 38
End: 2023-12-13
Payer: COMMERCIAL

## 2023-12-13 VITALS
RESPIRATION RATE: 18 BRPM | HEART RATE: 102 BPM | DIASTOLIC BLOOD PRESSURE: 84 MMHG | OXYGEN SATURATION: 97 % | WEIGHT: 173 LBS | BODY MASS INDEX: 29.53 KG/M2 | SYSTOLIC BLOOD PRESSURE: 137 MMHG | HEIGHT: 64 IN

## 2023-12-13 PROCEDURE — 99214 OFFICE O/P EST MOD 30 MIN: CPT

## 2023-12-13 RX ORDER — DULAGLUTIDE 0.75 MG/.5ML
0.75 INJECTION, SOLUTION SUBCUTANEOUS
Qty: 3 | Refills: 2 | Status: DISCONTINUED | COMMUNITY
Start: 2023-04-26 | End: 2023-12-13

## 2023-12-13 RX ORDER — SEMAGLUTIDE 1.34 MG/ML
2 INJECTION, SOLUTION SUBCUTANEOUS
Qty: 3 | Refills: 1 | Status: DISCONTINUED | COMMUNITY
Start: 2022-10-28 | End: 2023-12-13

## 2023-12-15 NOTE — HISTORY OF PRESENT ILLNESS
[FreeTextEntry1] : Ms. JACQUES DIAZ  is a 38 year old female with past medical history of Diabetes Mellitus Type 2, Chronic back pain s/p surgery and chronic steroid injections who presents for management of her diabetes. Patient is undergoing a divorce with 4 children involved. She is trying to work. She admits that she has not been taking care of herself and will miss medications.   POCT Glucose: mg/dL POCT Hga1c: %   Diabetes first diagnosed: at age 20 Type: 2   Current diabetic regimen: Basaglar 35 units QAM Novolog 35 units TID Metformin 1000 mg BID Ozempic 1 mg Qweekly Other relevant medications:  Self monitoring blood glucose : Patient is using glucose meter  SMBG: fastins post-meals:200-300s   Hypoglycemia:none   Diet:  Breakfast: skips mostly Lunch: rice and chicken or beef, cereal Dinner: rice and meat or fish, eggplants, string bean Snacks: chips, or crackers, banana, apples  Exercise:  Urine micro: 54 (2021), 55 mg/g (2021),  55 mg/g (2020) lipid profile: LDL 99 (2021), LDL 65 (2021), 118 (2020) last hba1c:10.2% (2022), 11.8% (2022), 10.2% (2021) 11.8% (3/2021), 7.9% (2020), 12.8% (2020) eye exam:  diabetic foot exam/podiatry: in office (2020)

## 2023-12-15 NOTE — ASSESSMENT
[FreeTextEntry1] : 38 year old female with past medical history of Diabetes Mellitus Type 2, Chronic back pain s/p surgery and chronic steroid injections who presents for management of her diabetes  1. DM 2- uncontrolled, uncomplicated Patient counseled on the importance of diabetic control and complications. Patient's diet and the necessary changes discussed. Reviewed over glycemic goals. I encouraged patient to use Dexcom again and to take her insulins timely. She can control her sugar with proper diet, timing of insulin and monitoring.  Cont Basaglar 35 units switch to AM Cont Novolog 35 units TID Cont Metformin 1000 mg BID Inc to Ozempic 2 mg Qweekly Check fsg QID Cont diabetic diet Cont exercise Patient will have labs by cardiology and forward the results Opthalmology Visit up to date Foot Exam at follow up  2. HLD- elevated Cont Atorvastatin 20 mg QD

## 2024-01-21 ENCOUNTER — RX RENEWAL (OUTPATIENT)
Age: 39
End: 2024-01-21

## 2024-02-29 NOTE — ED ADULT NURSE NOTE - OBJECTIVE STATEMENT
Head, normocephalic, atraumatic, Face, Face within normal limits, Ears, External ears within normal limits, Nose/Nasopharynx, External nose normal appearance, nares patent, no nasal discharge, Mouth and Throat, Oral cavity appearance normal, Lips, Appearance normal
Received pt into spot #11 via stretcher. Pt c/o low back pain radiating into right lower ext x 2 days. Pt laying on left side grimacing radiating pain 10/10. Pt received morphine IV in QUID prior to coming to rm 11. Pt talking on cell phone. Pt evaluated by Dr. Castillo. Awaiting new orders. Hot packs given for comfort as requested by patient. Denies numbness at present.

## 2024-03-08 ENCOUNTER — RX RENEWAL (OUTPATIENT)
Age: 39
End: 2024-03-08

## 2024-04-05 ENCOUNTER — RX RENEWAL (OUTPATIENT)
Age: 39
End: 2024-04-05

## 2024-04-15 ENCOUNTER — APPOINTMENT (OUTPATIENT)
Dept: ENDOCRINOLOGY | Facility: CLINIC | Age: 39
End: 2024-04-15
Payer: COMMERCIAL

## 2024-04-15 DIAGNOSIS — E11.65 TYPE 2 DIABETES MELLITUS WITH HYPERGLYCEMIA: ICD-10-CM

## 2024-04-15 DIAGNOSIS — E78.5 HYPERLIPIDEMIA, UNSPECIFIED: ICD-10-CM

## 2024-04-15 PROCEDURE — G2211 COMPLEX E/M VISIT ADD ON: CPT | Mod: 95

## 2024-04-15 PROCEDURE — 99214 OFFICE O/P EST MOD 30 MIN: CPT | Mod: 95

## 2024-04-15 RX ORDER — SEMAGLUTIDE 2.68 MG/ML
8 INJECTION, SOLUTION SUBCUTANEOUS
Qty: 1 | Refills: 6 | Status: ACTIVE | COMMUNITY
Start: 2021-08-31 | End: 1900-01-01

## 2024-04-15 RX ORDER — INSULIN GLARGINE 100 [IU]/ML
100 INJECTION, SOLUTION SUBCUTANEOUS DAILY
Qty: 5 | Refills: 5 | Status: ACTIVE | COMMUNITY
Start: 2018-02-06 | End: 1900-01-01

## 2024-04-15 RX ORDER — BLOOD-GLUCOSE TRANSMITTER
EACH MISCELLANEOUS
Qty: 1 | Refills: 3 | Status: ACTIVE | COMMUNITY
Start: 2021-07-08 | End: 1900-01-01

## 2024-04-15 RX ORDER — INSULIN ASPART 100 [IU]/ML
100 INJECTION, SOLUTION INTRAVENOUS; SUBCUTANEOUS
Qty: 2 | Refills: 5 | Status: ACTIVE | COMMUNITY
Start: 2020-10-08 | End: 1900-01-01

## 2024-04-15 RX ORDER — BLOOD-GLUCOSE SENSOR
EACH MISCELLANEOUS
Qty: 3 | Refills: 2 | Status: ACTIVE | COMMUNITY
Start: 2021-05-03 | End: 1900-01-01

## 2024-04-15 RX ORDER — FLASH GLUCOSE SCANNING READER
EACH MISCELLANEOUS
Qty: 1 | Refills: 0 | Status: DISCONTINUED | COMMUNITY
Start: 2020-09-01 | End: 2024-04-15

## 2024-04-15 RX ORDER — METFORMIN ER 500 MG 500 MG/1
500 TABLET ORAL
Qty: 120 | Refills: 6 | Status: ACTIVE | COMMUNITY
Start: 2023-04-26 | End: 1900-01-01

## 2024-04-15 RX ORDER — FLASH GLUCOSE SENSOR
KIT MISCELLANEOUS
Qty: 6 | Refills: 2 | Status: DISCONTINUED | COMMUNITY
Start: 2020-09-01 | End: 2024-04-15

## 2024-04-15 NOTE — ASSESSMENT
[FreeTextEntry1] : 38 year old female with past medical history of Diabetes Mellitus Type 2, Chronic back pain s/p surgery and chronic steroid injections who presents for management of her diabetes  1. DM 2- uncontrolled, uncomplicated Patient counseled on the importance of diabetic control and complications. Patient's diet and the necessary changes discussed. Reviewed over glycemic goals. I encouraged patient to use Dexcom again and to take her insulins timely. She can control her sugar with proper diet, timing of insulin and monitoring.  Inc Basaglar 40 units switch to AM Cont Novolog 35 units TID Cont Metformin 1000 mg BID Cont Ozempic 2 mg Qweekly Check fsg QID Cont diabetic diet Cont exercise Patient will have labs by cardiology and forward the results Opthalmology Visit up to date Foot Exam at follow up  2. HLD- elevated Cont Atorvastatin 20 mg QD

## 2024-06-05 RX ORDER — BLOOD SUGAR DIAGNOSTIC
STRIP MISCELLANEOUS 4 TIMES DAILY
Qty: 100 | Refills: 2 | Status: ACTIVE | COMMUNITY
Start: 2018-02-21 | End: 1900-01-01

## 2025-03-10 ENCOUNTER — APPOINTMENT (OUTPATIENT)
Dept: ENDOCRINOLOGY | Facility: CLINIC | Age: 40
End: 2025-03-10
Payer: COMMERCIAL

## 2025-03-10 DIAGNOSIS — E11.65 TYPE 2 DIABETES MELLITUS WITH HYPERGLYCEMIA: ICD-10-CM

## 2025-03-10 DIAGNOSIS — E78.5 HYPERLIPIDEMIA, UNSPECIFIED: ICD-10-CM

## 2025-03-10 PROCEDURE — 99214 OFFICE O/P EST MOD 30 MIN: CPT | Mod: 95

## 2025-03-12 NOTE — PHYSICAL THERAPY INITIAL EVALUATION ADULT - LEVEL OF INDEPENDENCE: SCOOT/BRIDGE, REHAB EVAL
No. LUISITO screening performed.  STOP BANG Legend: 0-2 = LOW Risk; 3-4 = INTERMEDIATE Risk; 5-8 = HIGH Risk
independent

## 2025-03-13 ENCOUNTER — TRANSCRIPTION ENCOUNTER (OUTPATIENT)
Age: 40
End: 2025-03-13

## 2025-05-02 ENCOUNTER — RX RENEWAL (OUTPATIENT)
Age: 40
End: 2025-05-02

## 2025-05-12 NOTE — CONSULT NOTE ADULT - CONSULT REQUESTED DATE/TIME
Research Note Follow Up Visit       Iglesia Rojo is here today for 12 month follow up on [address].  Follow up procedures completed per protocol. Patient is aware of continued follow up plan.      Education Documentation  No documentation found.  Education Comments  No comments found.     Patient feeling well, no complaints.  Dr. Nye would like to have a PSA, Testosterone drawn in 3 months.  Next protocol visit with labs will be in 6 months.    21-Jan-2018 12:51

## 2025-05-26 ENCOUNTER — RX RENEWAL (OUTPATIENT)
Age: 40
End: 2025-05-26

## 2025-06-17 ENCOUNTER — APPOINTMENT (OUTPATIENT)
Dept: ENDOCRINOLOGY | Facility: CLINIC | Age: 40
End: 2025-06-17
Payer: MEDICAID

## 2025-06-17 VITALS
HEART RATE: 100 BPM | WEIGHT: 172 LBS | HEIGHT: 64 IN | SYSTOLIC BLOOD PRESSURE: 128 MMHG | BODY MASS INDEX: 29.37 KG/M2 | DIASTOLIC BLOOD PRESSURE: 84 MMHG | OXYGEN SATURATION: 97 %

## 2025-06-17 PROCEDURE — 99214 OFFICE O/P EST MOD 30 MIN: CPT

## 2025-06-17 PROCEDURE — G2211 COMPLEX E/M VISIT ADD ON: CPT | Mod: NC

## 2025-06-17 RX ORDER — ASPIRIN 81 MG/1
81 TABLET, DELAYED RELEASE ORAL
Refills: 0 | Status: ACTIVE | COMMUNITY

## 2025-06-17 RX ORDER — LISINOPRIL 30 MG/1
TABLET ORAL
Refills: 0 | Status: ACTIVE | COMMUNITY

## 2025-06-17 RX ORDER — BLOOD-GLUCOSE SENSOR
EACH MISCELLANEOUS
Qty: 9 | Refills: 2 | Status: ACTIVE | COMMUNITY
Start: 2025-06-17 | End: 1900-01-01

## 2025-06-17 RX ORDER — BLOOD-GLUCOSE METER
KIT MISCELLANEOUS
Qty: 1 | Refills: 0 | Status: ACTIVE | COMMUNITY
Start: 2025-06-17 | End: 1900-01-01

## 2025-06-17 RX ORDER — METOPROLOL TARTRATE 75 MG/1
TABLET, FILM COATED ORAL
Refills: 0 | Status: ACTIVE | COMMUNITY

## 2025-06-17 RX ORDER — RIZATRIPTAN BENZOATE 10 MG/1
TABLET ORAL
Refills: 0 | Status: ACTIVE | COMMUNITY

## 2025-06-17 RX ORDER — ELECTROLYTES/DEXTROSE
32G X 4 MM SOLUTION, ORAL ORAL
Qty: 4 | Refills: 3 | Status: ACTIVE | COMMUNITY
Start: 2025-06-17 | End: 1900-01-01

## 2025-06-17 RX ORDER — TIRZEPATIDE 5 MG/.5ML
5 INJECTION, SOLUTION SUBCUTANEOUS
Qty: 3 | Refills: 2 | Status: ACTIVE | COMMUNITY
Start: 2025-06-17 | End: 1900-01-01

## 2025-06-24 ENCOUNTER — TRANSCRIPTION ENCOUNTER (OUTPATIENT)
Age: 40
End: 2025-06-24

## 2025-06-25 ENCOUNTER — APPOINTMENT (OUTPATIENT)
Dept: ORTHOPEDIC SURGERY | Facility: CLINIC | Age: 40
End: 2025-06-25
Payer: MEDICAID

## 2025-06-25 VITALS — BODY MASS INDEX: 29.37 KG/M2 | HEIGHT: 64 IN | WEIGHT: 172 LBS

## 2025-06-25 PROBLEM — M25.512 LEFT SHOULDER PAIN, UNSPECIFIED CHRONICITY: Status: RESOLVED | Noted: 2025-06-25 | Resolved: 2025-06-25

## 2025-06-25 PROBLEM — M75.52 BURSITIS OF LEFT SHOULDER: Status: ACTIVE | Noted: 2025-06-25

## 2025-06-25 PROCEDURE — 73030 X-RAY EXAM OF SHOULDER: CPT | Mod: LT

## 2025-06-25 PROCEDURE — 72110 X-RAY EXAM L-2 SPINE 4/>VWS: CPT

## 2025-06-25 PROCEDURE — 99204 OFFICE O/P NEW MOD 45 MIN: CPT

## 2025-06-27 RX ORDER — INSULIN GLARGINE 100 [IU]/ML
100 INJECTION, SOLUTION SUBCUTANEOUS
Qty: 3 | Refills: 2 | Status: ACTIVE | COMMUNITY
Start: 2025-06-17

## 2025-07-02 ENCOUNTER — TRANSCRIPTION ENCOUNTER (OUTPATIENT)
Age: 40
End: 2025-07-02

## 2025-07-02 RX ORDER — DICLOFENAC SODIUM 20 MG/G
2 SOLUTION TOPICAL 3 TIMES DAILY
Qty: 1 | Refills: 0 | Status: ACTIVE | COMMUNITY
Start: 2025-06-25 | End: 1900-01-01